# Patient Record
Sex: MALE | Race: BLACK OR AFRICAN AMERICAN | Employment: OTHER | URBAN - METROPOLITAN AREA
[De-identification: names, ages, dates, MRNs, and addresses within clinical notes are randomized per-mention and may not be internally consistent; named-entity substitution may affect disease eponyms.]

---

## 2017-02-09 ENCOUNTER — OFFICE VISIT (OUTPATIENT)
Dept: INTERNAL MEDICINE CLINIC | Age: 77
End: 2017-02-09

## 2017-02-09 VITALS
HEIGHT: 71 IN | OXYGEN SATURATION: 98 % | DIASTOLIC BLOOD PRESSURE: 79 MMHG | WEIGHT: 153.9 LBS | TEMPERATURE: 98.2 F | BODY MASS INDEX: 21.55 KG/M2 | HEART RATE: 71 BPM | SYSTOLIC BLOOD PRESSURE: 135 MMHG

## 2017-02-09 DIAGNOSIS — N40.0 PROSTATISM: ICD-10-CM

## 2017-02-09 DIAGNOSIS — R79.89 AZOTEMIA: ICD-10-CM

## 2017-02-09 DIAGNOSIS — R80.9 PROTEINURIA: ICD-10-CM

## 2017-02-09 DIAGNOSIS — E78.5 DYSLIPIDEMIA: ICD-10-CM

## 2017-02-09 DIAGNOSIS — L30.9 DERMATITIS: ICD-10-CM

## 2017-02-09 DIAGNOSIS — Z00.00 ROUTINE GENERAL MEDICAL EXAMINATION AT A HEALTH CARE FACILITY: ICD-10-CM

## 2017-02-09 DIAGNOSIS — I10 ESSENTIAL HYPERTENSION: Primary | ICD-10-CM

## 2017-02-09 RX ORDER — SIMVASTATIN 40 MG/1
40 TABLET, FILM COATED ORAL
Qty: 30 TAB | Refills: 11 | Status: SHIPPED | OUTPATIENT
Start: 2017-02-09 | End: 2018-02-08 | Stop reason: SDUPTHER

## 2017-02-09 RX ORDER — LOSARTAN POTASSIUM 100 MG/1
100 TABLET ORAL DAILY
Qty: 30 TAB | Refills: 11 | Status: SHIPPED | OUTPATIENT
Start: 2017-02-09 | End: 2018-02-08 | Stop reason: SDUPTHER

## 2017-02-09 RX ORDER — FINASTERIDE 5 MG/1
5 TABLET, FILM COATED ORAL DAILY
Qty: 30 TAB | Refills: 11 | Status: SHIPPED | OUTPATIENT
Start: 2017-02-09 | End: 2017-02-10

## 2017-02-09 RX ORDER — AMLODIPINE BESYLATE 10 MG/1
10 TABLET ORAL DAILY
Qty: 30 TAB | Refills: 11 | Status: SHIPPED | OUTPATIENT
Start: 2017-02-09 | End: 2017-02-10

## 2017-02-09 RX ORDER — SILDENAFIL 100 MG/1
TABLET, FILM COATED ORAL
Qty: 7 TAB | Refills: 11 | Status: SHIPPED | OUTPATIENT
Start: 2017-02-09 | End: 2018-02-08 | Stop reason: CLARIF

## 2017-02-09 NOTE — PROGRESS NOTES
Chief Complaint   Patient presents with    Hypertension     follow up     Well Male   1. Have you been to the ER, urgent care clinic since your last visit? Hospitalized since your last visit? Yes Reason for visit: sore throat and back pain    2. Have you seen or consulted any other health care providers outside of the [address] [place] [address] since your last visit? Include any pap smears or colon screening.  Yes Where: [facility]

## 2017-02-09 NOTE — PROGRESS NOTES
[facility]  [place] [address]  [address] [facility] [address]  Phone:  [phone]  Fax: [phone]       Chief Complaint   Patient presents with    Hypertension     follow up     Well Male   . SUBJECTIVE:    Mita Diaz is a 68 y.o. male comes in for return visit stating that he is generally doing well. He remains physically and mentally active. He is a retired . He has two sons and three grandchildren. He has been taking his antihypertensive medication as prescribed as is the case with his statin for his increased cardiovascular risk. On his last lab work he had a slight increase in his creatinine with a mild degree of proteinuria. I am not entirely sure of the significance of this. Finally, he does see a urologist annually. Facility-Administered Medications Ordered in Other Visits   Medication Dose Route Frequency Provider Last Rate Last Dose    [START ON 2/12/2017] valsartan (DIOVAN) tablet 160 mg  160 mg Oral DAILY Alethea Shah MD        sodium chloride (NS) flush 5-10 mL  5-10 mL IntraVENous Q8H Jayy Class V, MD   10 mL at 02/11/17 1434    sodium chloride (NS) flush 5-10 mL  5-10 mL IntraVENous PRN Alethea Shah MD        acetaminophen (TYLENOL) tablet 650 mg  650 mg Oral Q4H PRN Alethea Shah MD        ondansetron [facility]) injection 4 mg  4 mg IntraVENous Q4H PRN Alethea Shah MD        bisacodyl (DULCOLAX) tablet 5 mg  5 mg Oral DAILY PRN Alethea Shah MD        atorvastatin (LIPITOR) tablet 20 mg  20 mg Oral QHS Jayy ORTIZ MD   20 mg at 02/10/17 2216    amLODIPine (NORVASC) tablet 5 mg  5 mg Oral DAILY Alethea Shah MD   5 mg at 02/11/17 6611    finasteride (PROSCAR) tablet 5 mg  5 mg Oral DAILY Alethea Shah MD   5 mg at 02/11/17 5200     Past Medical History   Diagnosis Date    Hypertension      History reviewed. No pertinent past surgical history.   Allergies   Allergen Reactions    Lisinopril Swelling         REVIEW OF SYSTEMS:  General: negative for - chills or fever  ENT: negative for - headaches, nasal congestion or tinnitus  Respiratory: negative for - cough, hemoptysis, shortness of breath or wheezing  Cardiovascular : negative for - chest pain, edema, palpitations or shortness of breath  Gastrointestinal: negative for - abdominal pain, blood in stools, heartburn or nausea/vomiting  Genito-Urinary: no dysuria, trouble voiding, or hematuria  Musculoskeletal: negative for - gait disturbance, joint pain, joint stiffness or joint swelling  Neurological: no TIA or stroke symptoms  Hematologic: no bruises, no bleeding, no swollen glands  Integument: no lumps, mole changes, nail changes or rash  Endocrine: no malaise/lethargy or unexpected weight changes      Social History     Social History    Marital status:      Spouse name: N/A    Number of children: N/A    Years of education: N/A     Social History Main Topics    Smoking status: Never Smoker    Smokeless tobacco: Never Used    Alcohol use Yes    Drug use: No    Sexual activity: Yes     Partners: Female     Other Topics Concern    None     Social History Narrative     History reviewed. No pertinent family history. OBJECTIVE:    Visit Vitals    /79 (BP 1 Location: Left arm, BP Patient Position: Sitting)    Pulse 71    Temp 98.2 °F (36.8 °C) (Oral)    Ht 5' 11\" (1.803 m)    Wt 153 lb 14.4 oz (69.8 kg)    SpO2 98%    BMI 21.46 kg/m2     CONSTITUTIONAL: well , well nourished, appears age appropriate  EYES: perrla, eom intact  ENMT:moist mucous membranes, pharynx clear  NECK: supple. Thyroid normal  RESPIRATORY: Chest: clear to ascultation and percussion   CARDIOVASCULAR: Heart: regular rate and rhythm  GASTROINTESTINAL: Abdomen: soft, bowel sounds active  HEMATOLOGIC: no pathological lymph nodes palpated  MUSCULOSKELETAL: Extremities: no edema, pulse 1+   INTEGUMENT: No unusual rashes or suspicious skin lesions noted.  Nails appear normal.  NEUROLOGIC: non-focal exam   MENTAL STATUS: alert and oriented, appropriate affect      ASSESSMENT:  1. Essential hypertension    2. Dyslipidemia    3. Dermatitis    4. Proteinuria    5. Azotemia    6. Prostatism    7. Routine general medical examination at a health care facility        PLAN:    1. The patient's blood pressure is excellent today at 120/70. No adjustments are made. 2. Lipid status will be assessed in view of his history of dyslipidemia. He is in a primary cardiovascular risk prevention mode. 3. He has a nonspecific dermatitis suggestive of tinea versicolor. Nothing need be done. 4. He did have a mild azotemia on his last visit with increase proteinuria. This will be measured and quantitated again. I will assume there should be no progression. Addendum:  The patient declines a colonoscopy. .  Orders Placed This Encounter    APOLIPOPROTEIN B    CBC WITH AUTOMATED DIFF    LIPID PANEL    URINALYSIS W/ RFLX MICROSCOPIC    METABOLIC PANEL, COMPREHENSIVE    PROTEIN,TOTAL,URINE    CREATININE, UR, RANDOM    CYSTATIN C    MICROSCOPIC EXAMINATION    AMB POC EKG ROUTINE W/ 12 LEADS, INTER & REP    DISCONTD: amLODIPine (NORVASC) 10 mg tablet    simvastatin (ZOCOR) 40 mg tablet    losartan (COZAAR) 100 mg tablet    sildenafil citrate (VIAGRA) 100 mg tablet    DISCONTD: finasteride (PROSCAR) 5 mg tablet         Follow-up Disposition:  Return in about 1 year (around 2/9/2018). Floyd Rogel MD    This is a Subsequent Medicare Annual Wellness Visit providing Personalized Prevention Plan Services (PPPS) (Performed 12 months after initial AWV and PPPS )    I have reviewed the patient's medical history in detail and updated the computerized patient record. History     Past Medical History   Diagnosis Date    Hypertension       History reviewed. No pertinent past surgical history.   Facility-Administered Medications Ordered in Other Visits   Medication Dose Route Frequency Provider Last Rate Last Dose    [START ON 2/12/2017] valsartan (DIOVAN) tablet 160 mg  160 mg Oral DAILY Jg Puga MD        sodium chloride (NS) flush 5-10 mL  5-10 mL IntraVENous Q8H Mindi ORTIZ MD   10 mL at 02/11/17 1434    sodium chloride (NS) flush 5-10 mL  5-10 mL IntraVENous PRN Jg Puga MD        acetaminophen (TYLENOL) tablet 650 mg  650 mg Oral Q4H PRN Jg Puga MD        ondansetron [facility]) injection 4 mg  4 mg IntraVENous Q4H PRN Jg Puga MD        bisacodyl (DULCOLAX) tablet 5 mg  5 mg Oral DAILY PRN Jg Puga MD        atorvastatin (LIPITOR) tablet 20 mg  20 mg Oral QHS Mindi ORTIZ MD   20 mg at 02/10/17 2216    amLODIPine (NORVASC) tablet 5 mg  5 mg Oral DAILY Jg Puga MD   5 mg at 02/11/17 1447    finasteride (PROSCAR) tablet 5 mg  5 mg Oral DAILY Jg Puga MD   5 mg at 02/11/17 8892     Allergies   Allergen Reactions    Lisinopril Swelling     History reviewed. No pertinent family history. Social History   Substance Use Topics    Smoking status: Never Smoker    Smokeless tobacco: Never Used    Alcohol use Yes     Patient Active Problem List   Diagnosis Code    Hypertension I10    Prostatism N40.0    Dyslipidemia E78.5    Dermatitis L30.9    Proteinuria R80.9    Thrombocytopenia ([address] D69.6       Depression Risk Factor Screening:     PHQ 2 / 9, over the last two weeks 2/9/2017   Little interest or pleasure in doing things Not at all   Feeling down, depressed or hopeless Not at all   Total Score PHQ 2 0     Alcohol Risk Factor Screening: On any occasion during the past 3 months, have you had more than 4 drinks containing alcohol? Yes    Do you average more than 14 drinks per week? No      Functional Ability and Level of Safety:     Hearing Loss   none    Activities of Daily Living   Self-care. Requires assistance with: no ADLs    Fall Risk     Fall Risk Assessment, last 12 mths 2/9/2017   Able to walk? Yes   Fall in past 12 months?  No     Abuse Screen Patient is not abused    Review of Systems   A comprehensive review of systems was negative. Physical Examination     Evaluation of Cognitive Function:  Mood/affect:  neutral  Appearance: age appropriate  Family member/caregiver input: na    Visit Vitals    /79 (BP 1 Location: Left arm, BP Patient Position: Sitting)    Pulse 71    Temp 98.2 °F (36.8 °C) (Oral)    Ht 5' 11\" (1.803 m)    Wt 153 lb 14.4 oz (69.8 kg)    SpO2 98%    BMI 21.46 kg/m2     General:  Alert, cooperative, no distress, appears stated age. Head:  Normocephalic, without obvious abnormality, atraumatic. Eyes:  Conjunctivae/corneas clear. PERRL, EOMs intact. Fundi benign   Ears:  Normal TMs and external ear canals both ears. Nose: Nares normal. Septum midline. Mucosa normal. No drainage or sinus tenderness. Throat: Lips, mucosa, and tongue normal. Teeth and gums normal.   Neck: Supple, symmetrical, trachea midline, no adenopathy, thyroid: no enlargement/tenderness/nodules, no carotid bruit and no JVD. Back:   Symmetric, no curvature. ROM normal. No CVA tenderness. Lungs:   Clear to auscultation bilaterally. Chest wall:  No tenderness or deformity. Heart:  Regular rate and rhythm, S1, S2 normal, no murmur, click, rub or gallop. Abdomen:   Soft, non-tender. Bowel sounds normal. No masses,  No organomegaly. Genitalia:  deferred. Rectal:  Normal tone, normal prostate, no masses or tenderness  Guaiac negative stool. Extremities: Extremities normal, atraumatic, no cyanosis or edema. Pulses: 2+ and symmetric all extremities. Skin: Skin color, texture, turgor normal. No rashes or lesions   Lymph nodes: Cervical, supraclavicular, and axillary nodes normal.   Neurologic: CNII-XII intact. Normal strength, sensation and reflexes throughout.        Patient Care Team:  Gonzalo Argueta MD as PCP - General (Internal Medicine)    Advice/Referrals/Counseling   Education and counseling provided:  Are appropriate based on today's review and evaluation    Assessment/Plan       ICD-10-CM ICD-9-CM    1. Essential hypertension I10 401.9 CBC WITH AUTOMATED DIFF      URINALYSIS W/ RFLX MICROSCOPIC      CT COLLECTION VENOUS BLOOD,VENIPUNCTURE      METABOLIC PANEL, COMPREHENSIVE      CYSTATIN C      AMB POC EKG ROUTINE W/ 12 LEADS, INTER & REP      DISCONTINUED: amLODIPine (NORVASC) 10 mg tablet   2. Dyslipidemia E78.5 272.4 APOLIPOPROTEIN B      LIPID PANEL   3. Dermatitis L30.9 692.9    4. Proteinuria R80.9 791.0 PROTEIN,TOTAL,URINE      CREATININE, UR, RANDOM   5. Azotemia R79.89 790.6 CYSTATIN C   6. Prostatism N40.0 600.90 DISCONTINUED: finasteride (PROSCAR) 5 mg tablet   7. Routine general medical examination at a health care facility Z00.00 V70.0    . [facility]  [place] [address]  [address] [place] [address]  Phone:  [phone]  Fax: [phone]       Chief Complaint   Patient presents with    Hypertension     follow up     Well Male   . SUBJECTIVE:    Vasu Herrera is a 68 y.o. male comes in for his yearly physical.  He has no major complaints. He has been taking his antihypertensive medication as prescribed as is the case with his statin for his increased cardiovascular risk. He has a history of prostatism and sees a urologist on an annual basis. He is not physically active. He formerly did substitute teaching but none of late. He is assisting with the care of his grandchildren.              Facility-Administered Medications Ordered in Other Visits   Medication Dose Route Frequency Provider Last Rate Last Dose    [START ON 2/12/2017] valsartan (DIOVAN) tablet 160 mg  160 mg Oral DAILY Farooq Pritchett MD        sodium chloride (NS) flush 5-10 mL  5-10 mL IntraVENous Q8H Wilber ORTIZ MD   10 mL at 02/11/17 1434    sodium chloride (NS) flush 5-10 mL  5-10 mL IntraVENous PRN Faoroq Pritchett MD        acetaminophen (TYLENOL) tablet 650 mg  650 mg Oral Q4H PRN Farooq Pritchett MD  ondansetron (ZOFRAN) injection 4 mg  4 mg IntraVENous Q4H PRN Yamila Villanueva MD        bisacodyl (DULCOLAX) tablet 5 mg  5 mg Oral DAILY PRN Yamila Villanueva MD        atorvastatin (LIPITOR) tablet 20 mg  20 mg Oral QHS Gene ORTIZ MD   20 mg at 02/10/17 2216    amLODIPine (NORVASC) tablet 5 mg  5 mg Oral DAILY Yamila Villanueva MD   5 mg at 02/11/17 2421    finasteride (PROSCAR) tablet 5 mg  5 mg Oral DAILY Yamila Villanueva MD   5 mg at 02/11/17 7029     Past Medical History   Diagnosis Date    Hypertension      History reviewed. No pertinent past surgical history. Allergies   Allergen Reactions    Lisinopril Swelling         REVIEW OF SYSTEMS:  General: negative for - chills or fever  ENT: negative for - headaches, nasal congestion or tinnitus  Respiratory: negative for - cough, hemoptysis, shortness of breath or wheezing  Cardiovascular : negative for - chest pain, edema, palpitations or shortness of breath  Gastrointestinal: negative for - abdominal pain, blood in stools, heartburn or nausea/vomiting  Genito-Urinary: no dysuria, trouble voiding, or hematuria  Musculoskeletal: negative for - gait disturbance, joint pain, joint stiffness or joint swelling  Neurological: no TIA or stroke symptoms  Hematologic: no bruises, no bleeding, no swollen glands  Integument: no lumps, mole changes, nail changes or rash  Endocrine: no malaise/lethargy or unexpected weight changes      Social History     Social History    Marital status:      Spouse name: N/A    Number of children: N/A    Years of education: N/A     Social History Main Topics    Smoking status: Never Smoker    Smokeless tobacco: Never Used    Alcohol use Yes    Drug use: No    Sexual activity: Yes     Partners: Female     Other Topics Concern    None     Social History Narrative     History reviewed. No pertinent family history.     OBJECTIVE:    Visit Vitals    /79 (BP 1 Location: Left arm, BP Patient Position: Sitting)    Pulse 71    Temp 98.2 °F (36.8 °C) (Oral)    Ht 5' 11\" (1.803 m)    Wt 153 lb 14.4 oz (69.8 kg)    SpO2 98%    BMI 21.46 kg/m2     CONSTITUTIONAL: well , well nourished, appears age appropriate  EYES: perrla, eom intact  ENMT:moist mucous membranes, pharynx clear  NECK: supple. Thyroid normal  RESPIRATORY: Chest: clear to ascultation and percussion   CARDIOVASCULAR: Heart: regular rate and rhythm  GASTROINTESTINAL: Abdomen: soft, bowel sounds active  HEMATOLOGIC: no pathological lymph nodes palpated  MUSCULOSKELETAL: Extremities: no edema, pulse 1+   INTEGUMENT: No unusual rashes or suspicious skin lesions noted. Nails appear normal.  NEUROLOGIC: non-focal exam   MENTAL STATUS: alert and oriented, appropriate affect      ASSESSMENT:  1. Essential hypertension    2. Dyslipidemia    3. Dermatitis    4. Proteinuria    5. Azotemia    6. Prostatism    7. Routine general medical examination at a health care facility        PLAN:    1. Blood pressure is excellent today and no adjustments are made. 2. He will continue his statin as prescribed and efficacy and compliance will be assessed. 3. He has a nonspecific dermatitis which he has had for years and does not really complain about this. 4. His last several visits he has had mild proteinuria. This will be quantitated. 5. He also developed a mild prerenal azotemia. He never returned for additional lab work. Hopefully this has not progressed but I see no reason for that to have done so.    6. He will follow-up with his urologist.        Orders Placed This Encounter    APOLIPOPROTEIN B    CBC WITH AUTOMATED DIFF    LIPID PANEL    URINALYSIS W/ RFLX MICROSCOPIC    METABOLIC PANEL, COMPREHENSIVE    PROTEIN,TOTAL,URINE    CREATININE, UR, RANDOM    CYSTATIN C    MICROSCOPIC EXAMINATION    AMB POC EKG ROUTINE W/ 12 LEADS, INTER & REP    DISCONTD: amLODIPine (NORVASC) 10 mg tablet    simvastatin (ZOCOR) 40 mg tablet    losartan (COZAAR) 100 mg tablet    sildenafil citrate (VIAGRA) 100 mg tablet    DISCONTD: finasteride (PROSCAR) 5 mg tablet         Follow-up Disposition:  Return in about 1 year (around 2/9/2018).       Joanne Hester MD

## 2017-02-09 NOTE — MR AVS SNAPSHOT
Visit Information Date & Time Provider Department Dept. Phone Encounter #  
 2/9/2017 11:30 AM Hyacinth Hammans, MD [facility] [phone] [id number] Follow-up Instructions Return in about 1 year (around 2/9/2018). Upcoming Health Maintenance Date Due  
 MEDICARE YEARLY EXAM 10/1/2016 GLAUCOMA SCREENING Q2Y 10/1/2017 Pneumococcal 65+ Low/Medium Risk (2 of 2 - PPSV23) 2/9/2018 DTaP/Tdap/Td series (2 - Td) 2/9/2027 Allergies as of 2/9/2017  Review Complete On: 2/9/2017 By: Luis Thapa Severity Noted Reaction Type Reactions Lisinopril  10/14/2014    Swelling Current Immunizations  Never Reviewed No immunizations on file. Not reviewed this visit You Were Diagnosed With   
  
 Codes Comments Essential hypertension    -  Primary ICD-10-CM: I10 
ICD-9-CM: 401.9 Dyslipidemia     ICD-10-CM: E78.5 ICD-9-CM: 272.4 Dermatitis     ICD-10-CM: L30.9 ICD-9-CM: 692.9 Proteinuria     ICD-10-CM: R80.9 ICD-9-CM: 791.0 Azotemia     ICD-10-CM: R79.89 ICD-9-CM: 790.6 Prostatism     ICD-10-CM: N40.0 ICD-9-CM: 600.90 Vitals BP Pulse Temp Height(growth percentile) Weight(growth percentile) SpO2  
 135/79 (BP 1 Location: Left arm, BP Patient Position: Sitting) 71 98.2 °F (36.8 °C) (Oral) 5' 11\" (1.803 m) 153 lb 14.4 oz (69.8 kg) 98% BMI Smoking Status 21.46 kg/m2 Never Smoker BMI and BSA Data Body Mass Index Body Surface Area  
 21.46 kg/m 2 1.87 m 2 Preferred Pharmacy Pharmacy Name Phone [id number] Kartik Hammer [address] [phone] Your Updated Medication List  
  
   
This list is accurate as of: 2/9/17 12:51 PM.  Always use your most recent med list. amLODIPine 10 mg tablet Commonly known as:  Royer Llamas Take 1 Tab by mouth daily. finasteride 5 mg tablet Commonly known as:  PROSCAR Take 1 Tab by mouth daily. losartan 100 mg tablet Commonly known as:  COZAAR Take 1 Tab by mouth daily. sildenafil citrate 100 mg tablet Commonly known as:  VIAGRA TAKE 1 TABLET BY MOUTH AS NEEDED  
  
 simvastatin 40 mg tablet Commonly known as:  ZOCOR Take 1 Tab by mouth nightly. Prescriptions Sent to Pharmacy Refills  
 amLODIPine (NORVASC) 10 mg tablet 11 Sig: Take 1 Tab by mouth daily. Class: Normal  
 Pharmacy: [facility] [address] Ph #: [phone] Route: Oral  
 simvastatin (ZOCOR) 40 mg tablet 11 Sig: Take 1 Tab by mouth nightly. Class: Normal  
 [facility] [address] Ph #: [phone] Route: Oral  
 losartan (COZAAR) 100 mg tablet 11 Sig: Take 1 Tab by mouth daily. Class: Normal  
 Pharmacy: [address] Ph #: [phone] Route: Oral  
 sildenafil citrate (VIAGRA) 100 mg tablet 11 Sig: TAKE 1 TABLET BY MOUTH AS NEEDED Class: Normal  
 [facility] [address] Ph #: [phone]  
 finasteride (PROSCAR) 5 mg tablet 11 Sig: Take 1 Tab by mouth daily. Class: Normal  
 Pharmacy: [address] Ph #: [phone] Route: Oral  
  
We Performed the Following AMB POC EKG ROUTINE W/ 12 LEADS, INTER & REP [44611 CPT(R)] APOLIPOPROTEIN B [id number] CPT(R)] CBC WITH AUTOMATED DIFF [70456 CPT(R)] CREATININE, UR, RANDOM [id number] CPT(R)] CYSTATIN C [[id number] Custom] LIPID PANEL [30025 CPT(R)] METABOLIC PANEL, COMPREHENSIVE [85544 CPT(R)] LA COLLECTION VENOUS BLOOD,VENIPUNCTURE [id number] CPT(R)] Michelle Castellanos [[id number] Custom] URINALYSIS W/ RFLX MICROSCOPIC [43109 CPT(R)] Follow-up Instructions Return in about 1 year (around 2/9/2018). Introducing [facility]! [facility] introduces [facility] patient portal. Now you can access parts of your medical record, email your doctor's office, and request medication refills online. 1. In your internet browser, go to [email or web address] 2. Click on the First Time User? Click Here link in the Sign In box. You will see the New Member Sign Up page. 3. Enter your [employer] Access Code exactly as it appears below. You will not need to use this code after youve completed the sign-up process. If you do not sign up before the expiration date, you must request a new code. · [employer] Access Code: [email or web address] Expires: 5/10/2017 12:51 PM 
 
4. Enter the last four digits of your Social Security Number (xxxx) and Date of Birth (mm/dd/yyyy) as indicated and click Submit. You will be taken to the next sign-up page. 5. Create a [employer] ID. This will be your [employer] login ID and cannot be changed, so think of one that is secure and easy to remember. 6. Create a [employer] password. You can change your password at any time. 7. Enter your Password Reset Question and Answer. This can be used at a later time if you forget your password. 8. Enter your e-mail address. You will receive e-mail notification when new information is available in [address] 9. Click Sign Up. You can now view and download portions of your medical record. 10. Click the Download Summary menu link to download a portable copy of your medical information. If you have questions, please visit the Frequently Asked Questions section of the [employer] website. Remember, [employer] is NOT to be used for urgent needs. For medical emergencies, dial 911. Now available from your iPhone and Android! Please provide this summary of care documentation to your next provider. Your primary care clinician is listed as Marly Varela. If you have any questions after today's visit, please call [phone].

## 2017-02-10 ENCOUNTER — HOSPITAL ENCOUNTER (INPATIENT)
Age: 77
LOS: 1 days | Discharge: HOME | DRG: 813 | End: 2017-02-13
Attending: EMERGENCY MEDICINE | Admitting: INTERNAL MEDICINE
Payer: MEDICARE

## 2017-02-10 ENCOUNTER — CLINICAL SUPPORT (OUTPATIENT)
Dept: INTERNAL MEDICINE CLINIC | Age: 77
End: 2017-02-10

## 2017-02-10 DIAGNOSIS — D69.6 THROMBOCYTOPENIA (HCC): Primary | ICD-10-CM

## 2017-02-10 DIAGNOSIS — D69.6 THROMBOCYTOPENIA, UNSPECIFIED (HCC): Primary | ICD-10-CM

## 2017-02-10 LAB
ALBUMIN SERPL BCP-MCNC: 3.6 G/DL (ref 3.5–5)
ALBUMIN/GLOB SERPL: 1 {RATIO} (ref 1.1–2.2)
ALP SERPL-CCNC: 50 U/L (ref 45–117)
ALT SERPL-CCNC: 27 U/L (ref 12–78)
ANION GAP BLD CALC-SCNC: 7 MMOL/L (ref 5–15)
AST SERPL W P-5'-P-CCNC: 22 U/L (ref 15–37)
BASOPHILS # BLD AUTO: 0.1 K/UL
BASOPHILS # BLD: 2 %
BILIRUB SERPL-MCNC: 0.4 MG/DL (ref 0.2–1)
BUN SERPL-MCNC: 22 MG/DL (ref 6–20)
BUN/CREAT SERPL: 15 (ref 12–20)
CALCIUM SERPL-MCNC: 8.8 MG/DL (ref 8.5–10.1)
CHLORIDE SERPL-SCNC: 107 MMOL/L (ref 97–108)
CO2 SERPL-SCNC: 28 MMOL/L (ref 21–32)
CREAT SERPL-MCNC: 1.49 MG/DL (ref 0.7–1.3)
EOSINOPHIL # BLD: 0.2 K/UL
EOSINOPHIL NFR BLD: 5 %
ERYTHROCYTE [DISTWIDTH] IN BLOOD BY AUTOMATED COUNT: 13.5 % (ref 11.5–14.5)
GLOBULIN SER CALC-MCNC: 3.6 G/DL (ref 2–4)
GLUCOSE SERPL-MCNC: 92 MG/DL (ref 65–100)
HCT VFR BLD AUTO: 39.3 % (ref 36.6–50.3)
HGB BLD-MCNC: 12.5 G/DL (ref 12.1–17)
LYMPHOCYTES # BLD AUTO: 34 %
LYMPHOCYTES # BLD: 1.1 K/UL
MCH RBC QN AUTO: 28.2 PG (ref 26–34)
MCHC RBC AUTO-ENTMCNC: 31.8 G/DL (ref 30–36.5)
MCV RBC AUTO: 88.7 FL (ref 80–99)
MONOCYTES # BLD: 0.5 K/UL
MONOCYTES NFR BLD AUTO: 16 %
NEUTS SEG # BLD: 1.4 K/UL
NEUTS SEG NFR BLD AUTO: 43 %
PLATELET # BLD AUTO: 16 K/UL (ref 150–400)
PLATELET COMMENTS,PCOM: ABNORMAL
POTASSIUM SERPL-SCNC: 4.1 MMOL/L (ref 3.5–5.1)
PROT SERPL-MCNC: 7.2 G/DL (ref 6.4–8.2)
RBC # BLD AUTO: 4.43 M/UL (ref 4.1–5.7)
RBC MORPH BLD: ABNORMAL
SODIUM SERPL-SCNC: 142 MMOL/L (ref 136–145)
WBC # BLD AUTO: 3.3 K/UL (ref 4.1–11.1)

## 2017-02-10 PROCEDURE — 86900 BLOOD TYPING SEROLOGIC ABO: CPT | Performed by: INTERNAL MEDICINE

## 2017-02-10 PROCEDURE — 85025 COMPLETE CBC W/AUTO DIFF WBC: CPT | Performed by: EMERGENCY MEDICINE

## 2017-02-10 PROCEDURE — 36415 COLL VENOUS BLD VENIPUNCTURE: CPT | Performed by: EMERGENCY MEDICINE

## 2017-02-10 PROCEDURE — 99285 EMERGENCY DEPT VISIT HI MDM: CPT

## 2017-02-10 PROCEDURE — 74011250637 HC RX REV CODE- 250/637: Performed by: INTERNAL MEDICINE

## 2017-02-10 PROCEDURE — 80053 COMPREHEN METABOLIC PANEL: CPT | Performed by: EMERGENCY MEDICINE

## 2017-02-10 PROCEDURE — 65660000000 HC RM CCU STEPDOWN

## 2017-02-10 RX ORDER — ATORVASTATIN CALCIUM 10 MG/1
20 TABLET, FILM COATED ORAL
Status: DISCONTINUED | OUTPATIENT
Start: 2017-02-10 | End: 2017-02-13 | Stop reason: HOSPADM

## 2017-02-10 RX ORDER — SODIUM CHLORIDE 0.9 % (FLUSH) 0.9 %
5-10 SYRINGE (ML) INJECTION EVERY 8 HOURS
Status: DISCONTINUED | OUTPATIENT
Start: 2017-02-10 | End: 2017-02-13 | Stop reason: HOSPADM

## 2017-02-10 RX ORDER — SODIUM CHLORIDE 0.9 % (FLUSH) 0.9 %
5-10 SYRINGE (ML) INJECTION AS NEEDED
Status: DISCONTINUED | OUTPATIENT
Start: 2017-02-10 | End: 2017-02-13 | Stop reason: HOSPADM

## 2017-02-10 RX ORDER — ONDANSETRON 2 MG/ML
4 INJECTION INTRAMUSCULAR; INTRAVENOUS
Status: DISCONTINUED | OUTPATIENT
Start: 2017-02-10 | End: 2017-02-13 | Stop reason: HOSPADM

## 2017-02-10 RX ORDER — ACETAMINOPHEN 325 MG/1
650 TABLET ORAL
Status: DISCONTINUED | OUTPATIENT
Start: 2017-02-10 | End: 2017-02-13 | Stop reason: HOSPADM

## 2017-02-10 RX ORDER — VALSARTAN 40 MG/1
160 TABLET ORAL DAILY
Status: DISCONTINUED | OUTPATIENT
Start: 2017-02-11 | End: 2017-02-11

## 2017-02-10 RX ORDER — FINASTERIDE 5 MG/1
5 TABLET, FILM COATED ORAL DAILY
Status: DISCONTINUED | OUTPATIENT
Start: 2017-02-11 | End: 2017-02-13 | Stop reason: HOSPADM

## 2017-02-10 RX ORDER — BISACODYL 5 MG
5 TABLET, DELAYED RELEASE (ENTERIC COATED) ORAL DAILY PRN
Status: DISCONTINUED | OUTPATIENT
Start: 2017-02-10 | End: 2017-02-13 | Stop reason: HOSPADM

## 2017-02-10 RX ORDER — AMLODIPINE BESYLATE 5 MG/1
5 TABLET ORAL DAILY
Status: DISCONTINUED | OUTPATIENT
Start: 2017-02-11 | End: 2017-02-13 | Stop reason: HOSPADM

## 2017-02-10 RX ADMIN — Medication 10 ML: at 22:18

## 2017-02-10 RX ADMIN — ATORVASTATIN CALCIUM 20 MG: 10 TABLET, FILM COATED ORAL at 22:16

## 2017-02-10 NOTE — IP AVS SNAPSHOT
[address] [facility] 
[phone] Patient: Edwina Rao. MRN: [id number] KIX:[id number] You are allergic to the following Allergen Reactions Lisinopril Swelling Immunizations Administered for This Admission Name Date Influenza Vaccine (Quad) PF  Deferred () Recent Documentation Height Weight BMI Smoking Status 1.816 m 70.6 kg 21.41 kg/m2 Never Smoker Unresulted Labs Order Current Status HIV 1/2 AG/AB, 4TH GENERATION,W RFLX CONFIRM In process SAMPLE TO BLOOD BANK In process Emergency Contacts Name Discharge Info Relation Home Work Mobile Olga Brunner  Spouse [3] [phone] About your hospitalization You were admitted on:  February 10, 2017 You last received care in the:  [facility] 3 NEUROSCIENCE TELEMETRY You were discharged on:  February 13, 2017 Unit phone number:  [phone] Why you were hospitalized Your primary diagnosis was:  Not on File Your diagnoses also included: Thrombocytopenia (Hcc) Providers Seen During Your Hospitalizations Provider Role Specialty Primary office phone Rashad Medrano MD Attending Provider Emergency Medicine [phone] Preet Joyner MD Attending Provider Internal Medicine [phone] Giovanny Townsend MD Attending Provider Internal Medicine [phone] Your Primary Care Physician (PCP) Primary Care Physician Office Phone Office Fax [address] [facility] [phone] Follow-up Information Follow up With Details Comments Contact Info Giovanny Townsend MD In 3 days Please schedule an appointment to be seen in 3-5 days [id number] [place] [address] 
[phone] Current Discharge Medication List  
  
START taking these medications Dose & Instructions Dispensing Information Comments Morning Noon Evening Bedtime  
 predniSONE 20 mg tablet Commonly known as:  Orlean Aas Your next dose is: Today, Tomorrow Other:  _________ Dose:  60 mg Take 3 Tabs by mouth daily. Quantity:  60 Tab Refills:  0 CONTINUE these medications which have NOT CHANGED Dose & Instructions Dispensing Information Comments Morning Noon Evening Bedtime  
 losartan 100 mg tablet Commonly known as:  COZAAR Your next dose is: Today, Tomorrow Other:  _________ Dose:  100 mg Take 1 Tab by mouth daily. Quantity:  30 Tab Refills:  11  
     
   
   
   
  
 sildenafil citrate 100 mg tablet Commonly known as:  VIAGRA Your next dose is: Today, Tomorrow Other:  _________ TAKE 1 TABLET BY MOUTH AS NEEDED Quantity:  7 Tab Refills:  11  
     
   
   
   
  
 simvastatin 40 mg tablet Commonly known as:  ZOCOR Your next dose is: Today, Tomorrow Other:  _________ Dose:  40 mg Take 1 Tab by mouth nightly. Quantity:  30 Tab Refills:  11 Where to Get Your Medications These medications were sent to [address] [place] [address], [address] [place] [address] Phone:  [phone]  
  predniSONE 20 mg tablet Discharge Instructions General Discharge Instructions Patient ID: 
Jose Fuentes 
[id number] 
68 y.o. 
1940 Patient Instructions The following personal items were collected during your admission and were returned to you. Take Home Medications What to do at [facility] Recommended diet: Regular Diet Recommended activity: Activity as tolerated Follow-up with Danielito Cody MD  in 5 days.  
 
 
 
Information obtained by : 
I understand that if any problems occur once I am at home I am to contact my physician. I understand and acknowledge receipt of the instructions indicated above. Physician's or R.N.'s Signature                                                                  Date/Time Patient or Representative Signature                                                          Date/Time Discharge Orders None Introducing \[facility] [place] [facility]\"" & HEALTH SERVICES! Vivian Savage introduces [facility] patient portal. Now you can access parts of your medical record, email your doctor's office, and request medication refills online. 1. In your internet browser, go to [email or web address] 2. Click on the First Time User? Click Here link in the Sign In box. You will see the New Member Sign Up page. 3. Enter your [facility] Access Code exactly as it appears below. You will not need to use this code after youve completed the sign-up process. If you do not sign up before the expiration date, you must request a new code. · [facility] Access Code: [email or web address] Expires: 5/10/2017 12:51 PM 
 
4. Enter the last four digits of your Social Security Number (xxxx) and Date of Birth (mm/dd/yyyy) as indicated and click Submit. You will be taken to the next sign-up page. 5. Create a [facility] ID. This will be your [facility] login ID and cannot be changed, so think of one that is secure and easy to remember. 6. Create a [facility] password. You can change your password at any time. 7. Enter your Password Reset Question and Answer. This can be used at a later time if you forget your password. 8. Enter your e-mail address. You will receive e-mail notification when new information is available in [address] 9. Click Sign Up. You can now view and download portions of your medical record. 10. Click the Download Summary menu link to download a portable copy of your medical information. If you have questions, please visit the Frequently Asked Questions section of the [employer] website. Remember, [employer] is NOT to be used for urgent needs. For medical emergencies, dial 911. Now available from your iPhone and Android! General Information Please provide this summary of care documentation to your next provider. Patient Signature:  ____________________________________________________________ Date:  ____________________________________________________________  
  
Redd Wilde Provider Signature:  ____________________________________________________________ Date:  ____________________________________________________________

## 2017-02-10 NOTE — IP AVS SNAPSHOT
Current Discharge Medication List  
  
Take these medications at their scheduled times Dose & Instructions Dispensing Information Comments Morning Noon Evening Bedtime  
 losartan 100 mg tablet Commonly known as:  COZAAR Your next dose is: Today, Tomorrow Other:  ____________ Dose:  100 mg Take 1 Tab by mouth daily. Quantity:  30 Tab Refills:  11  
     
   
   
   
  
 predniSONE 20 mg tablet Commonly known as:  Grge Lever Your next dose is: Today, Tomorrow Other:  ____________ Dose:  60 mg Take 3 Tabs by mouth daily. Quantity:  60 Tab Refills:  0  
     
   
   
   
  
 simvastatin 40 mg tablet Commonly known as:  ZOCOR Your next dose is: Today, Tomorrow Other:  ____________ Dose:  40 mg Take 1 Tab by mouth nightly. Quantity:  30 Tab Refills:  11 Take these medications as directed Dose & Instructions Dispensing Information Comments Morning Noon Evening Bedtime  
 sildenafil citrate 100 mg tablet Commonly known as:  VIAGRA Your next dose is: Today, Tomorrow Other:  ____________ TAKE 1 TABLET BY MOUTH AS NEEDED Quantity:  7 Tab Refills:  11 Where to Get Your Medications These medications were sent to [facility], [address], [address] [place] [address] Phone:  [phone]  
  predniSONE 20 mg tablet

## 2017-02-11 LAB
ABO + RH BLD: NORMAL
ALBUMIN SERPL-MCNC: 4.5 G/DL (ref 3.5–4.8)
ALBUMIN/GLOB SERPL: 1.3 {RATIO} (ref 1.1–2.5)
ALP SERPL-CCNC: 57 IU/L (ref 39–117)
ALT SERPL-CCNC: 20 IU/L (ref 0–44)
ANION GAP BLD CALC-SCNC: 7 MMOL/L (ref 5–15)
APO B SERPL-MCNC: 71 MG/DL (ref 52–135)
APPEARANCE UR: ABNORMAL
AST SERPL-CCNC: 24 IU/L (ref 0–40)
BACTERIA #/AREA URNS HPF: ABNORMAL /[HPF]
BASOPHILS # BLD AUTO: 0 K/UL (ref 0–0.1)
BASOPHILS # BLD AUTO: 0.1 K/UL
BASOPHILS # BLD AUTO: 0.1 X10E3/UL (ref 0–0.2)
BASOPHILS # BLD: 1 % (ref 0–1)
BASOPHILS # BLD: 2 %
BASOPHILS NFR BLD AUTO: 2 %
BILIRUB SERPL-MCNC: 0.4 MG/DL (ref 0–1.2)
BILIRUB UR QL STRIP: NEGATIVE
BLASTS NFR BLD: 0 %
BLOOD GROUP ANTIBODIES SERPL: NORMAL
BLOOD GROUP ANTIBODIES SERPL: NORMAL
BUN SERPL-MCNC: 17 MG/DL (ref 6–20)
BUN SERPL-MCNC: 17 MG/DL (ref 8–27)
BUN/CREAT SERPL: 12 (ref 10–22)
BUN/CREAT SERPL: 13 (ref 12–20)
CALCIUM SERPL-MCNC: 8.2 MG/DL (ref 8.5–10.1)
CALCIUM SERPL-MCNC: 9.6 MG/DL (ref 8.6–10.2)
CASTS URNS QL MICRO: ABNORMAL /LPF
CHLORIDE SERPL-SCNC: 107 MMOL/L (ref 97–108)
CHLORIDE SERPL-SCNC: 98 MMOL/L (ref 96–106)
CHOLEST SERPL-MCNC: 162 MG/DL (ref 100–199)
CO2 SERPL-SCNC: 28 MMOL/L (ref 18–29)
CO2 SERPL-SCNC: 28 MMOL/L (ref 21–32)
COLOR UR: YELLOW
CREAT SERPL-MCNC: 1.32 MG/DL (ref 0.7–1.3)
CREAT SERPL-MCNC: 1.42 MG/DL (ref 0.76–1.27)
CREAT UR-MCNC: 94.8 MG/DL
CYSTATIN C SERPL-MCNC: 1.06 MG/L (ref 0.53–0.95)
DAT POLY-SP REAG RBC QL: NORMAL
DIFFERENTIAL METHOD BLD: ABNORMAL
DIFFERENTIAL METHOD BLD: ABNORMAL
EOSINOPHIL # BLD AUTO: 0.1 X10E3/UL (ref 0–0.4)
EOSINOPHIL # BLD: 0.1 K/UL
EOSINOPHIL # BLD: 0.2 K/UL (ref 0–0.4)
EOSINOPHIL NFR BLD AUTO: 2 %
EOSINOPHIL NFR BLD: 3 %
EOSINOPHIL NFR BLD: 5 % (ref 0–7)
EPI CELLS #/AREA URNS HPF: ABNORMAL /HPF
ERYTHROCYTE [DISTWIDTH] IN BLOOD BY AUTOMATED COUNT: 13.2 % (ref 11.5–14.5)
ERYTHROCYTE [DISTWIDTH] IN BLOOD BY AUTOMATED COUNT: 13.5 % (ref 11.5–14.5)
ERYTHROCYTE [DISTWIDTH] IN BLOOD BY AUTOMATED COUNT: 13.9 % (ref 12.3–15.4)
ERYTHROCYTE [SEDIMENTATION RATE] IN BLOOD: 7 MM/HR (ref 0–20)
GLOBULIN SER CALC-MCNC: 3.5 G/DL (ref 1.5–4.5)
GLUCOSE BLD STRIP.AUTO-MCNC: 162 MG/DL (ref 65–100)
GLUCOSE SERPL-MCNC: 124 MG/DL (ref 65–100)
GLUCOSE SERPL-MCNC: 89 MG/DL (ref 65–99)
GLUCOSE UR QL: NEGATIVE
HCT VFR BLD AUTO: 36.6 % (ref 36.6–50.3)
HCT VFR BLD AUTO: 38.3 % (ref 36.6–50.3)
HCT VFR BLD AUTO: 40.8 % (ref 37.5–51)
HDLC SERPL-MCNC: 85 MG/DL
HGB BLD-MCNC: 11.8 G/DL (ref 12.1–17)
HGB BLD-MCNC: 12.3 G/DL (ref 12.1–17)
HGB BLD-MCNC: 13.9 G/DL (ref 12.6–17.7)
HGB UR QL STRIP: NEGATIVE
IMM GRANULOCYTES # BLD: 0 X10E3/UL (ref 0–0.1)
IMM GRANULOCYTES NFR BLD: 1 %
KETONES UR QL STRIP: NEGATIVE
LDH SERPL L TO P-CCNC: 148 U/L (ref 85–241)
LDLC SERPL CALC-MCNC: 65 MG/DL (ref 0–99)
LEUKOCYTE ESTERASE UR QL STRIP: NEGATIVE
LYMPHOCYTES # BLD AUTO: 1.3 X10E3/UL (ref 0.7–3.1)
LYMPHOCYTES # BLD AUTO: 34 %
LYMPHOCYTES # BLD AUTO: 46 % (ref 12–49)
LYMPHOCYTES # BLD: 1 K/UL
LYMPHOCYTES # BLD: 1.5 K/UL (ref 0.8–3.5)
LYMPHOCYTES NFR BLD AUTO: 42 %
MANUAL DIFFERENTIAL PERFORMED BLD QL: ABNORMAL
MCH RBC QN AUTO: 28.3 PG (ref 26–34)
MCH RBC QN AUTO: 28.5 PG (ref 26–34)
MCH RBC QN AUTO: 29.2 PG (ref 26.6–33)
MCHC RBC AUTO-ENTMCNC: 32.1 G/DL (ref 30–36.5)
MCHC RBC AUTO-ENTMCNC: 32.2 G/DL (ref 30–36.5)
MCHC RBC AUTO-ENTMCNC: 34.1 G/DL (ref 31.5–35.7)
MCV RBC AUTO: 86 FL (ref 79–97)
MCV RBC AUTO: 87.8 FL (ref 80–99)
MCV RBC AUTO: 88.7 FL (ref 80–99)
METAMYELOCYTES NFR BLD MANUAL: 0 %
MICRO URNS: ABNORMAL
MONOCYTES # BLD AUTO: 0.3 X10E3/UL (ref 0.1–0.9)
MONOCYTES # BLD: 0.3 K/UL
MONOCYTES # BLD: 0.3 K/UL (ref 0–1)
MONOCYTES NFR BLD AUTO: 12 %
MONOCYTES NFR BLD AUTO: 8 %
MONOCYTES NFR BLD AUTO: 9 % (ref 5–13)
MORPHOLOGY BLD-IMP: ABNORMAL
MORPHOLOGY BLD-IMP: ABNORMAL
MUCOUS THREADS URNS QL MICRO: PRESENT
MYELOCYTES NFR BLD MANUAL: 0 %
NEUTROPHILS # BLD AUTO: 1.5 X10E3/UL (ref 1.4–7)
NEUTROPHILS NFR BLD AUTO: 45 %
NEUTS BAND NFR BLD MANUAL: 0 %
NEUTS SEG # BLD: 1.3 K/UL
NEUTS SEG # BLD: 1.3 K/UL (ref 1.8–8)
NEUTS SEG NFR BLD AUTO: 39 % (ref 32–75)
NEUTS SEG NFR BLD AUTO: 49 %
NITRITE UR QL STRIP: NEGATIVE
NRBC # BLD: 0 K/UL (ref 0–0.01)
NRBC BLD-RTO: 0 PER 100 WBC
PATH REV BLD -IMP: NORMAL
PH UR STRIP: 6.5 [PH] (ref 5–7.5)
PLATELET # BLD AUTO: 12 X10E3/UL (ref 150–379)
PLATELET # BLD AUTO: 16 K/UL (ref 150–400)
PLATELET # BLD AUTO: 18 X10E3/UL (ref 150–379)
PLATELET # BLD AUTO: 19 K/UL (ref 150–400)
PLATELET COMMENTS,PCOM: ABNORMAL
POTASSIUM SERPL-SCNC: 3.6 MMOL/L (ref 3.5–5.1)
POTASSIUM SERPL-SCNC: 4.5 MMOL/L (ref 3.5–5.2)
PROMYELOCYTES NFR BLD MANUAL: 0 %
PROT SERPL-MCNC: 8 G/DL (ref 6–8.5)
PROT UR QL STRIP: ABNORMAL
PROT UR-MCNC: 82.9 MG/DL
RBC # BLD AUTO: 4.17 M/UL (ref 4.1–5.7)
RBC # BLD AUTO: 4.32 M/UL (ref 4.1–5.7)
RBC # BLD AUTO: 4.76 X10E6/UL (ref 4.14–5.8)
RBC #/AREA URNS HPF: ABNORMAL /HPF
RBC MORPH BLD: ABNORMAL
RBC MORPH BLD: ABNORMAL
RETICS/RBC NFR AUTO: 0.5 % (ref 0.7–2.1)
SERVICE CMNT-IMP: ABNORMAL
SODIUM SERPL-SCNC: 142 MMOL/L (ref 134–144)
SODIUM SERPL-SCNC: 142 MMOL/L (ref 136–145)
SP GR UR: 1.02 (ref 1–1.03)
SPECIMEN EXP DATE BLD: NORMAL
TRIGL SERPL-MCNC: 61 MG/DL (ref 0–149)
UROBILINOGEN UR STRIP-MCNC: 0.2 MG/DL (ref 0.2–1)
VIT B12 SERPL-MCNC: 637 PG/ML (ref 211–911)
VLDLC SERPL CALC-MCNC: 12 MG/DL (ref 5–40)
WBC # BLD AUTO: 2.8 K/UL (ref 4.1–11.1)
WBC # BLD AUTO: 3.2 X10E3/UL (ref 3.4–10.8)
WBC # BLD AUTO: 3.3 K/UL (ref 4.1–11.1)
WBC #/AREA URNS HPF: ABNORMAL /HPF
WBC OTHER # BLD MANUAL: 0 10*3/UL

## 2017-02-11 PROCEDURE — 82607 VITAMIN B-12: CPT | Performed by: INTERNAL MEDICINE

## 2017-02-11 PROCEDURE — 36415 COLL VENOUS BLD VENIPUNCTURE: CPT | Performed by: INTERNAL MEDICINE

## 2017-02-11 PROCEDURE — 85045 AUTOMATED RETICULOCYTE COUNT: CPT | Performed by: INTERNAL MEDICINE

## 2017-02-11 PROCEDURE — 99218 HC RM OBSERVATION: CPT

## 2017-02-11 PROCEDURE — 86038 ANTINUCLEAR ANTIBODIES: CPT | Performed by: INTERNAL MEDICINE

## 2017-02-11 PROCEDURE — 85652 RBC SED RATE AUTOMATED: CPT | Performed by: INTERNAL MEDICINE

## 2017-02-11 PROCEDURE — 86880 COOMBS TEST DIRECT: CPT | Performed by: INTERNAL MEDICINE

## 2017-02-11 PROCEDURE — 82962 GLUCOSE BLOOD TEST: CPT

## 2017-02-11 PROCEDURE — 83615 LACTATE (LD) (LDH) ENZYME: CPT | Performed by: INTERNAL MEDICINE

## 2017-02-11 PROCEDURE — 85025 COMPLETE CBC W/AUTO DIFF WBC: CPT | Performed by: INTERNAL MEDICINE

## 2017-02-11 PROCEDURE — 74011250637 HC RX REV CODE- 250/637: Performed by: INTERNAL MEDICINE

## 2017-02-11 PROCEDURE — 85027 COMPLETE CBC AUTOMATED: CPT | Performed by: INTERNAL MEDICINE

## 2017-02-11 PROCEDURE — 80048 BASIC METABOLIC PNL TOTAL CA: CPT | Performed by: INTERNAL MEDICINE

## 2017-02-11 PROCEDURE — 65660000000 HC RM CCU STEPDOWN

## 2017-02-11 RX ORDER — VALSARTAN 160 MG/1
160 TABLET ORAL DAILY
Status: DISCONTINUED | OUTPATIENT
Start: 2017-02-12 | End: 2017-02-13 | Stop reason: HOSPADM

## 2017-02-11 RX ADMIN — AMLODIPINE BESYLATE 5 MG: 5 TABLET ORAL at 08:26

## 2017-02-11 RX ADMIN — FINASTERIDE 5 MG: 5 TABLET, FILM COATED ORAL at 08:26

## 2017-02-11 RX ADMIN — Medication 10 ML: at 14:34

## 2017-02-11 RX ADMIN — Medication 10 ML: at 06:44

## 2017-02-11 RX ADMIN — Medication 10 ML: at 20:48

## 2017-02-11 RX ADMIN — ATORVASTATIN CALCIUM 20 MG: 10 TABLET, FILM COATED ORAL at 20:47

## 2017-02-11 RX ADMIN — VALSARTAN 160 MG: 160 TABLET ORAL at 09:32

## 2017-02-11 NOTE — PROGRESS NOTES
..  * No surgery found *  Bedside and Verbal shift change report given to Tricia Leo (oncoming nurse) by Татьяна Velasquez (offgoing nurse). Report included the following information SBAR and Kardex. [facility] Phone:   [phone]      Significant changes during shift:  Hematology consult        Patient Celeste Smith.  68 y.o.  2/10/2017  7:53 PM by Esther Delgado MD. [employer]. was admitted from Home    Problem List    Patient Active Problem List    Diagnosis Date Noted    Thrombocytopenia ([address] 02/10/2017    Proteinuria 02/09/2017    Dermatitis 10/01/2015    Hypertension 10/14/2014    Prostatism 10/14/2014    Dyslipidemia 10/14/2014     Past Medical History   Diagnosis Date    Hypertension          Core Measures:    CVA: No No  CHF:No No  PNA:No No    Post Op Surgical (If Applicable):     Number times ambulated in hallway past shift:  0  Number of times OOB to chair past shift:   0  NG Tube: No  Incentive Spirometer: No  Drains: No   Volume    Dressing Present:  No  Flatus:  Yes    Activity Status:    OOB to Chair Yes  Ambulated this shift No   Bed Rest No    Supplemental O2: (If Applicable)    NC No  NRB No  Venti-mask No  On  Liters/min      LINES AND DRAINS:    Central Line? No Placement date  Reason Medically Necessary     PICC LINE? No Placement date Reason Medically Necessary     Urinary Catheter? No Placement Date  Reason Medically Necessary     DVT prophylaxis:    DVT prophylaxis Med- No   DVT prophylaxis SCD or MARK- No     Wounds: (If Applicable)    Wounds- No    Location     Patient Safety:    Falls Score Total Score: 1  Safety Level_______  Bed Alarm On? Refused  Sitter?  No    Plan for upcoming shift: monitor platelets        Discharge Plan: Yes     Active Consults:  IP CONSULT TO HEMATOLOGY

## 2017-02-11 NOTE — H&P
Hospitalist Admission Note    NAME: Cuong Galloway   :  1940   MRN:  [id number]     Date/Time:  2/10/2017 9:21 PM    Patient PCP: Raz Toth MD  ________________________________________________________________________    My assessment of this patient's clinical condition and my plan of care is as follows. Assessment / Plan:  Acute Thrombocytopenia with neutropenia  -unclear etiology, however differentials include decreased production from hypocellular bone marrow vs increased destruction from immune-mediated pathology, non-immune mediated (DIC, HUS, TTP), vasculitis, abnormal distribution or pooling  -CBC with differential  -peripheral smear  -type and screen  -transfuse for plt <10 or active bleeding  -hematology consulted    Hypertension  -continue arbs, amlodipine    Dyslipidemia  -continue statin    CKD  -cr stable    BPH  -continue finasteride      Code Status: Full  Surrogate Decision Maker: wife Tyree Styles at [phone]    DVT Prophylaxis: pt ambulatory  GI Prophylaxis: not indicated    Baseline: independent        Subjective:   CHIEF COMPLAINT: low platelet count    HISTORY OF PRESENT ILLNESS:     Chloe Espinoza is a 68 y.o.  male with pmhx significant for HTN present to ED from PCP for further evaluation of low platelet count on labs from PCP. Pt denies any recent illness, fever, chills, cp, sob, palpitations, n/v/d, or active bleeding. No recent changes in medications. No hx or family hx of thrombocytopenia. In the ED, vitals stable. Labs include PLT 16, wbc 3.3, cr 1.49. We were asked to admit for work up and evaluation of the above problems. Past Medical History   Diagnosis Date    Hypertension         History reviewed. No pertinent past surgical history. Social History   Substance Use Topics    Smoking status: Never Smoker    Smokeless tobacco: Never Used    Alcohol use Yes        History reviewed. No pertinent family history.   Allergies Allergen Reactions    Lisinopril Swelling        Prior to Admission medications    Medication Sig Start Date End Date Taking? Authorizing Provider   simvastatin (ZOCOR) 40 mg tablet Take 1 Tab by mouth nightly. 2/9/17   Raz Toth MD   losartan (COZAAR) 100 mg tablet Take 1 Tab by mouth daily. 2/9/17   Raz Toth MD   sildenafil citrate (VIAGRA) 100 mg tablet TAKE 1 TABLET BY MOUTH AS NEEDED 2/9/17   Raz Toth MD       REVIEW OF SYSTEMS:     I am not able to complete the review of systems because:    The patient is intubated and sedated    The patient has altered mental status due to his acute medical problems    The patient has baseline aphasia from prior stroke(s)    The patient has baseline dementia and is not reliable historian    The patient is in acute medical distress and unable to provide information           Total of 12 systems reviewed as follows:       POSITIVE= underlined text  Negative = text not underlined  General:  fever, chills, sweats, generalized weakness, weight loss/gain,      loss of appetite   Eyes:    blurred vision, eye pain, loss of vision, double vision  ENT:    rhinorrhea, pharyngitis   Respiratory:   cough, sputum production, SOB, ACEVEDO, wheezing, pleuritic pain   Cardiology:   chest pain, palpitations, orthopnea, PND, edema, syncope   Gastrointestinal:  abdominal pain , N/V, diarrhea, dysphagia, constipation, bleeding   Genitourinary:  frequency, urgency, dysuria, hematuria, incontinence   Muskuloskeletal :  arthralgia, myalgia, back pain  Hematology:  easy bruising, nose or gum bleeding, lymphadenopathy   Dermatological: rash, ulceration, pruritis, color change / jaundice  Endocrine:   hot flashes or polydipsia   Neurological:  headache, dizziness, confusion, focal weakness, paresthesia,     Speech difficulties, memory loss, gait difficulty  Psychological: Feelings of anxiety, depression, agitation    Objective:   VITALS:    Visit Vitals    /74    Pulse 70    Temp 98 °F (36.7 °C)    Resp 16    Ht 5' 11.5\" (1.816 m)    Wt 70.6 kg (155 lb 10.3 oz)    SpO2 100%    BMI 21.41 kg/m2       PHYSICAL EXAM:    General:    Alert, cooperative, no distress, appears stated age. HEENT: Atraumatic, anicteric sclerae, pink conjunctivae     No oral ulcers, mucosa moist, throat clear, dentition fair  Neck:  Supple, symmetrical,  thyroid: non tender  Lungs:   Clear to auscultation bilaterally. No Wheezing or Rhonchi. No rales. Chest wall:  No tenderness  No Accessory muscle use. Heart:   Regular  rhythm,  No  murmur   No edema  Abdomen:   Soft, non-tender. Not distended. Bowel sounds normal  Extremities: No cyanosis. No clubbing      Capillary refill normal,  Radial pulse 2+  Skin:     Not pale. Not Jaundiced  No rashes   Psych:  Good insight. Not depressed. Not anxious or agitated. Neurologic: EOMs intact. No facial asymmetry. No aphasia or slurred speech. Symmetrical strength, Sensation grossly intact. Alert and oriented X 4.     _______________________________________________________________________  Care Plan discussed with:    Comments   Patient x    Family      RN     Care Manager                    Consultant:      _______________________________________________________________________  Expected  Disposition:   Home with Family x   HH/PT/OT/RN    SNF/LTC    SANJIV    ________________________________________________________________________  TOTAL TIME:  60Minutes    Critical Care Provided     Minutes non procedure based      Comments    x Reviewed previous records   >50% of visit spent in counseling and coordination of care  Discussion with patient and/or family and questions answered       ________________________________________________________________________  Signed: Haja Coronado MD    Procedures: see electronic medical records for all procedures/Xrays and details which were not copied into this note but were reviewed prior to creation of Plan.     LAB DATA REVIEWED: Recent Results (from the past 24 hour(s))   CBC WITH AUTOMATED DIFF    Collection Time: 02/10/17  8:13 PM   Result Value Ref Range    WBC 3.3 (L) 4.1 - 11.1 K/uL    RBC 4.43 4.10 - 5.70 M/uL    HGB 12.5 12.1 - 17.0 g/dL    HCT 39.3 36.6 - 50.3 %    MCV 88.7 80.0 - 99.0 FL    MCH 28.2 26.0 - 34.0 PG    MCHC 31.8 30.0 - 36.5 g/dL    RDW 13.5 11.5 - 14.5 %    PLATELET 16 (LL) 833 - 400 K/uL    NEUTROPHILS 43 %    LYMPHOCYTES 34 %    MONOCYTES 16 %    EOSINOPHILS 5 %    BASOPHILS 2 %    ABS. NEUTROPHILS 1.4 K/UL    ABS. LYMPHOCYTES 1.1 K/UL    ABS. MONOCYTES 0.5 K/UL    ABS. EOSINOPHILS 0.2 K/UL    ABS. BASOPHILS 0.1 K/UL    PLATELET COMMENTS DECREASED PLATELETS      RBC COMMENTS NORMOCYTIC, NORMOCHROMIC     METABOLIC PANEL, COMPREHENSIVE    Collection Time: 02/10/17  8:13 PM   Result Value Ref Range    Sodium 142 136 - 145 mmol/L    Potassium 4.1 3.5 - 5.1 mmol/L    Chloride 107 97 - 108 mmol/L    CO2 28 21 - 32 mmol/L    Anion gap 7 5 - 15 mmol/L    Glucose 92 65 - 100 mg/dL    BUN 22 (H) 6 - 20 MG/DL    Creatinine 1.49 (H) 0.70 - 1.30 MG/DL    BUN/Creatinine ratio 15 12 - 20      GFR est AA 56 (L) >60 ml/min/1.73m2    GFR est non-AA 46 (L) >60 ml/min/1.73m2    Calcium 8.8 8.5 - 10.1 MG/DL    Bilirubin, total 0.4 0.2 - 1.0 MG/DL    ALT (SGPT) 27 12 - 78 U/L    AST (SGOT) 22 15 - 37 U/L    Alk.  phosphatase 50 45 - 117 U/L    Protein, total 7.2 6.4 - 8.2 g/dL    Albumin 3.6 3.5 - 5.0 g/dL    Globulin 3.6 2.0 - 4.0 g/dL    A-G Ratio 1.0 (L) 1.1 - 2.2

## 2017-02-11 NOTE — ED NOTES
Pt arrived ambulatory to room. Pt c/o low blood counts. Pt states his platelets were low after going to his PCP yesterday and today. Pt denies CP, SOB, weakness ,or fatigue. Monitor x 2. Call bell within reach and plan of care discussed.

## 2017-02-11 NOTE — PROGRESS NOTES
Hospitalist Progress Note    NAME: Aniceto Cardenas   :  1940   MRN:  [id number]       Interim Hospital Summary: 68 y.o. male whom presented on 2/10/2017 with      Assessment / Plan:    Acute Thrombocytopenia with neutropenia  -unclear etiology, however differentials include decreased production from hypocellular bone marrow vs increased destruction from immune-mediated pathology, non-immune mediated (DIC, HUS, TTP), vasculitis, abnormal distribution or pooling  -Plt remains at 16 without evidence of bleeding  -peripheral smear  -transfuse for plt <10 or active bleeding  -monitor cbc  -awaiting heme/onc evaluation     Hypertension  -continue arbs, amlodipine     Dyslipidemia  -continue statin     CKD  -cr stable     BPH  -continue finasteride        Code Status: Full  Surrogate Decision Maker: wife Joey Mathew at [phone]     DVT Prophylaxis: pt ambulatory  GI Prophylaxis: not indicated     Baseline: independent               Subjective:     Chief Complaint / Reason for Physician Visit  No acute complaints Discussed with RN events overnight. Review of Systems:  Symptom Y/N Comments  Symptom Y/N Comments   Fever/Chills n   Chest Pain n    Poor Appetite n   Edema n    Cough n   Abdominal Pain n    Sputum    Joint Pain n    SOB/ACEVEDO n   Pruritis/Rash n    Nausea/vomit n   Tolerating PT/OT     Diarrhea    Tolerating Diet     Constipation    Other       Could NOT obtain due to:      Objective:     VITALS:   Last 24hrs VS reviewed since prior progress note.  Most recent are:  Patient Vitals for the past 24 hrs:   Temp Pulse Resp BP SpO2   17 0727 97.9 °F (36.6 °C) (!) 57 16 123/77 97 %   17 0320 98.3 °F (36.8 °C) (!) 58 14 121/75 96 %   02/10/17 2300 - 60 14 (!) 141/91 99 %   02/10/17 2230 - (!) 57 14 132/84 97 %   02/10/17 2145 - (!) 58 - 138/85 99 %   02/10/17 2100 - - - 134/86 99 %   02/10/17 2015 - - - 131/74 100 %   02/10/17 1926 98 °F (36.7 °C) 70 16 - 100 %     No intake or output data in the 24 hours ending 02/11/17 1123     PHYSICAL EXAM:  General: WD, WN. Alert, cooperative, no acute distress    EENT:  EOMI. Anicteric sclerae. MMM  Resp:  CTA bilaterally, no wheezing or rales. No accessory muscle use  CV:  Regular  rhythm,  No edema  GI:  Soft, Non distended, Non tender.  +Bowel sounds  Neurologic:  Alert and oriented X 3, normal speech,   Psych:   Good insight. Not anxious nor agitated  Skin:  No rashes. No jaundice    Reviewed most current lab test results and cultures  YES  Reviewed most current radiology test results   YES  Review and summation of old records today    NO  Reviewed patient's current orders and MAR    YES  PMH/SH reviewed - no change compared to H&P  ________________________________________________________________________  Care Plan discussed with:    Comments   Patient n    Family      RN n    Care Manager     Consultant                        Multidiciplinary team rounds were held today with , nursing, pharmacist and clinical coordinator. Patient's plan of care was discussed; medications were reviewed and discharge planning was addressed. ________________________________________________________________________  Total NON critical care TIME:  45   Minutes    Total CRITICAL CARE TIME Spent:   Minutes non procedure based      Comments   >50% of visit spent in counseling and coordination of care x    ________________________________________________________________________  Ellouise Alpers, MD     Procedures: see electronic medical records for all procedures/Xrays and details which were not copied into this note but were reviewed prior to creation of Plan. LABS:  I reviewed today's most current labs and imaging studies.   Pertinent labs include:  Recent Labs      02/11/17   0242  02/10/17   2013  02/10/17   1526  02/09/17   1242   WBC  3.3*  3.3*   --   3.2*   HGB  11.8*  12.5   --   13.9   HCT  36.6  39.3   --   40.8   PLT  16*  16*  12*  18*     Recent Labs 02/11/17   0242  02/10/17   2013  02/09/17   1242   NA  142  142  142   K  3.6  4.1  4.5   CL  107  107  98   CO2  28  28  28   GLU  124*  92  89   BUN  17  22*  17   CREA  1.32*  1.49*  1.42*   CA  8.2*  8.8  9.6   ALB   --   3.6  4.5   TBILI   --   0.4  0.4   SGOT   --   22  24   ALT   --   27  20       Signed: Osmel David MD

## 2017-02-11 NOTE — ED PROVIDER NOTES
HPI Comments: Wells Ormond. is a 68 y.o. male with a history of hypertension who presents ambulatory from his PCP to [facility] ED for evaluation after labs drawn at his PCP's office today showed a low platelet count. Patient reports he was seen by his PCP for his annual visit, and denies any recent illness. Patient denies any known history of thrombocytopenia. Patient denies any easy bleeding with brushing his teeth but does report recent easy bruising. Patient denies any hematuria, hematochezia, melena, nausea, vomiting, diarrhea, chest pain, abdominal pain, cough, leg swelling, or any other symptoms at this time. PCP: Karen Boss MD    There are no other complaints, changes or physical findings at this time. The history is provided by the patient. Past Medical History:   Diagnosis Date    Hypertension        No past surgical history on file. No family history on file. Social History     Social History    Marital status:      Spouse name: N/A    Number of children: N/A    Years of education: N/A     Occupational History    Not on file. Social History Main Topics    Smoking status: Never Smoker    Smokeless tobacco: Never Used    Alcohol use Yes    Drug use: No    Sexual activity: Yes     Partners: Female     Other Topics Concern    Not on file     Social History Narrative     ALLERGIES: Lisinopril    Review of Systems   Constitutional: Negative for chills, fatigue and fever. Positive for low platelet count   HENT: Negative. Negative for congestion, rhinorrhea and sore throat. Eyes: Negative. Negative for pain, discharge and visual disturbance. Respiratory: Negative. Negative for cough, chest tightness, shortness of breath and wheezing. Cardiovascular: Negative. Negative for chest pain, palpitations and leg swelling. Gastrointestinal: Negative. Negative for abdominal pain, blood in stool, constipation, diarrhea, nausea and vomiting.    Endocrine: Negative. Genitourinary: Negative. Negative for dysuria, frequency and hematuria. Musculoskeletal: Negative. Negative for arthralgias, back pain and myalgias. Skin: Negative. Negative for rash. Allergic/Immunologic: Negative. Neurological: Negative. Negative for dizziness, weakness, light-headedness and headaches. Hematological: Bruises/bleeds easily. Psychiatric/Behavioral: Negative. All other systems reviewed and are negative. Patient Vitals for the past 12 hrs:   Temp Pulse Resp BP SpO2   02/10/17 2015 - - - 131/74 100 %   02/10/17 1926 98 °F (36.7 °C) 70 16 - 100 %      Physical Exam   Constitutional: He is oriented to person, place, and time. He appears well-developed and well-nourished. No distress. HENT:   Head: Normocephalic and atraumatic. Eyes: EOM are normal. Right eye exhibits no discharge. Left eye exhibits no discharge. No scleral icterus. Neck: Normal range of motion. Neck supple. No tracheal deviation present. Cardiovascular: Normal rate, regular rhythm, normal heart sounds and intact distal pulses. Exam reveals no gallop and no friction rub. No murmur heard. Pulmonary/Chest: Effort normal and breath sounds normal. No respiratory distress. He has no wheezes. He has no rales. Abdominal: Soft. He exhibits no distension. There is no tenderness. Musculoskeletal: Normal range of motion. He exhibits edema (1+ BLLE). Lymphadenopathy:     He has no cervical adenopathy. Neurological: He is alert and oriented to person, place, and time. Skin: Skin is warm and dry. No rash noted. Psychiatric: He has a normal mood and affect. Nursing note and vitals reviewed. MDM  Number of Diagnoses or Management Options  Thrombocytopenia [facility]):   Diagnosis management comments:     Patient presents to ED with acute thrombocytopenia, platelet count 16. No prior history of thrombocytopenia. No active bleeding. No indication for platelet transfusion at this time.   Will admit for further management. Amount and/or Complexity of Data Reviewed  Clinical lab tests: ordered and reviewed  Review and summarize past medical records: yes  Discuss the patient with other providers: yes (Hospitalist)    Patient Progress  Patient progress: stable      Procedures     Consult Note:  9:19 PM  Massiel Rushing MD spoke with Dr. Brianna Le,   Specialty: Hospitalist  Discussed patient's history, disposition, and available diagnostic and imaging results. Reviewed care plans. Consultant will evaluate patient for admission. LABORATORY TESTS:  Recent Results (from the past 12 hour(s))   CBC WITH AUTOMATED DIFF    Collection Time: 02/10/17  8:13 PM   Result Value Ref Range    WBC 3.3 (L) 4.1 - 11.1 K/uL    RBC 4.43 4.10 - 5.70 M/uL    HGB 12.5 12.1 - 17.0 g/dL    HCT 39.3 36.6 - 50.3 %    MCV 88.7 80.0 - 99.0 FL    MCH 28.2 26.0 - 34.0 PG    MCHC 31.8 30.0 - 36.5 g/dL    RDW 13.5 11.5 - 14.5 %    PLATELET 16 (LL) 618 - 400 K/uL    NEUTROPHILS 43 %    LYMPHOCYTES 34 %    MONOCYTES 16 %    EOSINOPHILS 5 %    BASOPHILS 2 %    ABS. NEUTROPHILS 1.4 K/UL    ABS. LYMPHOCYTES 1.1 K/UL    ABS. MONOCYTES 0.5 K/UL    ABS. EOSINOPHILS 0.2 K/UL    ABS. BASOPHILS 0.1 K/UL    PLATELET COMMENTS DECREASED PLATELETS      RBC COMMENTS NORMOCYTIC, NORMOCHROMIC     METABOLIC PANEL, COMPREHENSIVE    Collection Time: 02/10/17  8:13 PM   Result Value Ref Range    Sodium 142 136 - 145 mmol/L    Potassium 4.1 3.5 - 5.1 mmol/L    Chloride 107 97 - 108 mmol/L    CO2 28 21 - 32 mmol/L    Anion gap 7 5 - 15 mmol/L    Glucose 92 65 - 100 mg/dL    BUN 22 (H) 6 - 20 MG/DL    Creatinine 1.49 (H) 0.70 - 1.30 MG/DL    BUN/Creatinine ratio 15 12 - 20      GFR est AA 56 (L) >60 ml/min/1.73m2    GFR est non-AA 46 (L) >60 ml/min/1.73m2    Calcium 8.8 8.5 - 10.1 MG/DL    Bilirubin, total 0.4 0.2 - 1.0 MG/DL    ALT (SGPT) 27 12 - 78 U/L    AST (SGOT) 22 15 - 37 U/L    Alk.  phosphatase 50 45 - 117 U/L    Protein, total 7.2 6.4 - 8.2 g/dL Albumin 3.6 3.5 - 5.0 g/dL    Globulin 3.6 2.0 - 4.0 g/dL    A-G Ratio 1.0 (L) 1.1 - 2.2         IMPRESSION:  1. Thrombocytopenia ([address]        PLAN:  1. Admit to the main hospital    Admit Note:  9:19 PM  Patient is being admitted to the hospital by Dr. Korin France. The results of their tests and reasons for their admission has been discussed with the patient and/or available family. They convey agreement and understanding for the need to be admitted and for their admission diagnosis. This note is prepared by Jose M Quinn, acting as Scribe for Evaristo Zarate MD.    Evaristo Zarate MD: The scribe's documentation has been prepared under my direction and personally reviewed by me in its entirety. I confirm that the note above accurately reflects all work, treatment, procedures, and medical decision making performed by me.

## 2017-02-11 NOTE — ROUTINE PROCESS
22:47 ER called for report on patient coming to room 3107. Primary care nurse in the ER is off the floor and cannot take call. When she returns to ER, message will be relayed to Primary RN to return my call .

## 2017-02-11 NOTE — ROUTINE PROCESS
TRANSFER - OUT REPORT:    Verbal report given to Deyanira Jordan on [address]  being transferred to NSTU(unit) for routine progression of care       Report consisted of patients Situation, Background, Assessment and   Recommendations(SBAR). Information from the following report(s) SBAR, ED Summary, [facility] and Recent Results was reviewed with the receiving nurse. Lines:   Peripheral IV 02/10/17 Right Antecubital (Active)   Site Assessment Clean, dry, & intact 2/10/2017  8:21 PM   Phlebitis Assessment 0 2/10/2017  8:21 PM   Infiltration Assessment 0 2/10/2017  8:21 PM   Dressing Status Clean, dry, & intact 2/10/2017  8:21 PM   Dressing Type Transparent 2/10/2017  8:21 PM   Hub Color/Line Status Pink 2/10/2017  8:21 PM        Opportunity for questions and clarification was provided.       Patient transported with:

## 2017-02-11 NOTE — PROGRESS NOTES
Primary Nurse Angelito Raman RN and Rachana RN performed a dual skin assessment on this patient Impairment noted between right big toe healed area and bilateral lower legs skin is  dry and flaky.   Javi score is 21

## 2017-02-12 LAB
ANION GAP BLD CALC-SCNC: 8 MMOL/L (ref 5–15)
BASOPHILS # BLD AUTO: 0 K/UL (ref 0–0.1)
BASOPHILS # BLD: 1 % (ref 0–1)
BUN SERPL-MCNC: 15 MG/DL (ref 6–20)
BUN/CREAT SERPL: 12 (ref 12–20)
CALCIUM SERPL-MCNC: 8.6 MG/DL (ref 8.5–10.1)
CHLORIDE SERPL-SCNC: 106 MMOL/L (ref 97–108)
CO2 SERPL-SCNC: 26 MMOL/L (ref 21–32)
CREAT SERPL-MCNC: 1.3 MG/DL (ref 0.7–1.3)
DIFFERENTIAL METHOD BLD: ABNORMAL
EOSINOPHIL # BLD: 0.1 K/UL (ref 0–0.4)
EOSINOPHIL NFR BLD: 4 % (ref 0–7)
ERYTHROCYTE [DISTWIDTH] IN BLOOD BY AUTOMATED COUNT: 13.3 % (ref 11.5–14.5)
FERRITIN SERPL-MCNC: 53 NG/ML (ref 26–388)
FOLATE SERPL-MCNC: 22.8 NG/ML (ref 5–21)
GLUCOSE SERPL-MCNC: 100 MG/DL (ref 65–100)
HCT VFR BLD AUTO: 38 % (ref 36.6–50.3)
HGB BLD-MCNC: 12.4 G/DL (ref 12.1–17)
IRON SATN MFR SERPL: 23 % (ref 20–50)
IRON SERPL-MCNC: 73 UG/DL (ref 35–150)
LYMPHOCYTES # BLD AUTO: 43 % (ref 12–49)
LYMPHOCYTES # BLD: 1.4 K/UL (ref 0.8–3.5)
MCH RBC QN AUTO: 28.6 PG (ref 26–34)
MCHC RBC AUTO-ENTMCNC: 32.6 G/DL (ref 30–36.5)
MCV RBC AUTO: 87.8 FL (ref 80–99)
MONOCYTES # BLD: 0.3 K/UL (ref 0–1)
MONOCYTES NFR BLD AUTO: 10 % (ref 5–13)
NEUTS SEG # BLD: 1.5 K/UL (ref 1.8–8)
NEUTS SEG NFR BLD AUTO: 42 % (ref 32–75)
PLATELET # BLD AUTO: 18 K/UL (ref 150–400)
POTASSIUM SERPL-SCNC: 3.8 MMOL/L (ref 3.5–5.1)
RBC # BLD AUTO: 4.33 M/UL (ref 4.1–5.7)
RBC MORPH BLD: ABNORMAL
RETICS/RBC NFR AUTO: 0.5 % (ref 0.7–2.1)
SODIUM SERPL-SCNC: 140 MMOL/L (ref 136–145)
TIBC SERPL-MCNC: 311 UG/DL (ref 250–450)
VIT B12 SERPL-MCNC: 1244 PG/ML (ref 211–911)
WBC # BLD AUTO: 3.3 K/UL (ref 4.1–11.1)

## 2017-02-12 PROCEDURE — 85025 COMPLETE CBC W/AUTO DIFF WBC: CPT | Performed by: INTERNAL MEDICINE

## 2017-02-12 PROCEDURE — 99218 HC RM OBSERVATION: CPT

## 2017-02-12 PROCEDURE — 82728 ASSAY OF FERRITIN: CPT | Performed by: INTERNAL MEDICINE

## 2017-02-12 PROCEDURE — 85045 AUTOMATED RETICULOCYTE COUNT: CPT | Performed by: INTERNAL MEDICINE

## 2017-02-12 PROCEDURE — 82746 ASSAY OF FOLIC ACID SERUM: CPT | Performed by: INTERNAL MEDICINE

## 2017-02-12 PROCEDURE — 80048 BASIC METABOLIC PNL TOTAL CA: CPT | Performed by: INTERNAL MEDICINE

## 2017-02-12 PROCEDURE — 83540 ASSAY OF IRON: CPT | Performed by: INTERNAL MEDICINE

## 2017-02-12 PROCEDURE — 36415 COLL VENOUS BLD VENIPUNCTURE: CPT | Performed by: INTERNAL MEDICINE

## 2017-02-12 PROCEDURE — 74011250637 HC RX REV CODE- 250/637: Performed by: INTERNAL MEDICINE

## 2017-02-12 PROCEDURE — 65660000000 HC RM CCU STEPDOWN

## 2017-02-12 PROCEDURE — 82607 VITAMIN B-12: CPT | Performed by: INTERNAL MEDICINE

## 2017-02-12 PROCEDURE — 74011636637 HC RX REV CODE- 636/637: Performed by: INTERNAL MEDICINE

## 2017-02-12 RX ORDER — PREDNISONE 20 MG/1
60 TABLET ORAL
Status: DISCONTINUED | OUTPATIENT
Start: 2017-02-13 | End: 2017-02-12

## 2017-02-12 RX ORDER — PREDNISONE 20 MG/1
60 TABLET ORAL
Status: DISCONTINUED | OUTPATIENT
Start: 2017-02-12 | End: 2017-02-13 | Stop reason: HOSPADM

## 2017-02-12 RX ADMIN — AMLODIPINE BESYLATE 5 MG: 5 TABLET ORAL at 08:32

## 2017-02-12 RX ADMIN — FINASTERIDE 5 MG: 5 TABLET, FILM COATED ORAL at 08:32

## 2017-02-12 RX ADMIN — VALSARTAN 160 MG: 160 TABLET ORAL at 14:05

## 2017-02-12 RX ADMIN — Medication 10 ML: at 22:02

## 2017-02-12 RX ADMIN — Medication 10 ML: at 14:04

## 2017-02-12 RX ADMIN — PREDNISONE 60 MG: 20 TABLET ORAL at 11:40

## 2017-02-12 RX ADMIN — ATORVASTATIN CALCIUM 20 MG: 10 TABLET, FILM COATED ORAL at 22:01

## 2017-02-12 RX ADMIN — Medication 10 ML: at 03:25

## 2017-02-12 NOTE — PROGRESS NOTES
Feels good. Wants to go home. No bleeding    Afebrile. Lungs clear. Heart RRR. Abd benign. Skin no bruising    Plts 18K  WBC 2.8    Thrombocytosis  Leukopenia  HTN  BPH    Per heme-onc.

## 2017-02-12 NOTE — PROGRESS NOTES
This is a 68 yr old male who presented ambulatory from his PCP office for evaluation following abnormal labs. Patient has been admitted and diagnosed with acute thrombocytopenia with neutropenia. Patient reports independence and lives with his spouse. CM to follow for assessment of discharge needs however non anticipated. Care Management Interventions  PCP Verified by CM:  Yes  Discharge Durable Medical Equipment: No  Physical Therapy Consult: No  Occupational Therapy Consult: No  Speech Therapy Consult: No  Current Support Network: Lives with Spouse  Confirm Follow Up Transport: Family  Plan discussed with Pt/Family/Caregiver: Yes     Ex 0522

## 2017-02-12 NOTE — PROGRESS NOTES
Hematology / Oncology  Consultation      Damon Thiells    Referring:    Reason for consultation: severe thrombocytopenia    Consulting: Nancy Damon MD    ASSESSMENT:  Chief complaint  :Acute Thrombocytopenia 16k  with neutropenia  New problem, associated with low WBC, normal LDH makes ITP/TTP/HUS less likely, w/up ordered. R/o toxic, metabolic, deficiency, infectious,  marrow failure syndrome,DIC, autoimmune disease associated  -no evidence of bleeding  -peripheral smear  -transfuse for plt <10 or active bleeding  -monitor cbc for stability    Other issues:  Hypertension    Dyslipidemia  CKD      RECOMMENDATIONS:  Lab evaluation  Treatment trial with prednisone  If stable CBC can be released tomorrow evening. [facility] DVT prophylaxis      HPI:  67 y/o AA M presented for yearly physical to PCP and was noted to have low platelets. Sent to ER for that and admitted. Of note is he feels fine. No bleeding, no abdominal, chest or skeletal pains. No weight changes or constitutional symptoms, lumps or h/o recent illness, antibiotic use or new medications. Admission plt count 97786, WBC 3.3, ANC  1.4    We discussed the ddx of low platelets. His LDH is normal which makes TTP which may be the biggest concern unlikley. We discussed lab work, US abd and possible BM biopsy, Treatment trial with prednisone, but slightly low WBC/ANC could also reflect BM failure syndrome. We need to observe if his platelet count is stable, before he could be transferred to outpatient work up for that, because it is severely decreased. Denies excessive ETOH use. No h/o liver disease. ROS x 10 negative otherwise. The remainder of a 12 point review of system was negative. I discussed the patient with the RN   The hospital record was reviewed. Imaging studies reviewed:  Will review smear    Recent Labs      02/11/17   1433  02/11/17   0242  02/10/17   2013   WBC  2.8*  3.3*  3.3*   GRANS  49  39  43   HGB  12.3 11.8*  12.5   PLT  19*  16*  16*   NA   --   142  142   K   --   3.6  4.1   GLU   --   124*  92   BUN   --   17  22*   CREA   --   1.32*  1.49*   ALT   --    --   27   SGOT   --    --   22   TBILI   --    --   0.4   AP   --    --   50          Current Medications:  Current Facility-Administered Medications   Medication Dose Route Frequency    [START ON 2/12/2017] valsartan (DIOVAN) tablet 160 mg  160 mg Oral DAILY    [START ON 2/12/2017] influenza vaccine 2016-17 (36mos+)(PF) (FLUZONE/FLUARIX/FLULAVAL QUAD) injection 0.5 mL  0.5 mL IntraMUSCular PRIOR TO DISCHARGE    sodium chloride (NS) flush 5-10 mL  5-10 mL IntraVENous Q8H    sodium chloride (NS) flush 5-10 mL  5-10 mL IntraVENous PRN    acetaminophen (TYLENOL) tablet 650 mg  650 mg Oral Q4H PRN    ondansetron (ZOFRAN) injection 4 mg  4 mg IntraVENous Q4H PRN    bisacodyl (DULCOLAX) tablet 5 mg  5 mg Oral DAILY PRN    atorvastatin (LIPITOR) tablet 20 mg  20 mg Oral QHS    amLODIPine (NORVASC) tablet 5 mg  5 mg Oral DAILY    finasteride (PROSCAR) tablet 5 mg  5 mg Oral DAILY       Allergies   Allergen Reactions    Lisinopril Swelling       PMH:  Past Medical History   Diagnosis Date    Hypertension        Family History:  History reviewed. No pertinent family history. Negativve for hematological d/o or malignancy    Social History:  Social History     Social History    Marital status:      Spouse name: N/A    Number of children: N/A    Years of education: N/A     Occupational History    Not on file. Social History Main Topics    Smoking status: Never Smoker    Smokeless tobacco: Never Used    Alcohol use Yes    Drug use: No    Sexual activity: Yes     Partners: Female     Other Topics Concern    Not on file     Social History Narrative       Medications prior to admission:  No current facility-administered medications on file prior to encounter.       Current Outpatient Prescriptions on File Prior to Encounter   Medication Sig Dispense Refill    sildenafil citrate (VIAGRA) 100 mg tablet TAKE 1 TABLET BY MOUTH AS NEEDED 7 Tab 11    simvastatin (ZOCOR) 40 mg tablet Take 1 Tab by mouth nightly. 30 Tab 11    losartan (COZAAR) 100 mg tablet Take 1 Tab by mouth daily. 30 Tab 11         Exam:     General appearance  Alert,  no distress, well nourished   Eyes  Conjunctivae clear. Lids normal. Anicteric   ENT No thrush; no mucositis   Lungs  Clear to auscultation bilaterally   Chest wall  No deformities   Heart  Regular rate and rhythm, edema absent   Abdomen  Soft and non-tender; no masses, bowel sounds present, no distension   Extremities Symmetric, no clubbing, cyanosis. Skin No rash, no bruises   Neurologic Alert, oriented, moves all extremities, normal speech, ambulatory   Psychatric: Cooperative, verbalized understanding of our discussion, thought processes are clear, questions asked are appropriate. Musculoskeletal No joint deformities, no musclewasting. Line sites No bleeding, no tenderness.              Treating physicians: Treatment Team: Attending Provider: Sangita Singer MD; Consulting Provider: John Barnes MD; Consulting Provider: Justino Olivas MD; PCP - Health System Attribution Model: Sangita Singer MD    Total time >50 % spent on floor with counseling and coordination of care:

## 2017-02-13 VITALS
BODY MASS INDEX: 21.08 KG/M2 | HEIGHT: 72 IN | TEMPERATURE: 98.1 F | DIASTOLIC BLOOD PRESSURE: 70 MMHG | WEIGHT: 155.65 LBS | RESPIRATION RATE: 16 BRPM | HEART RATE: 69 BPM | OXYGEN SATURATION: 98 % | SYSTOLIC BLOOD PRESSURE: 121 MMHG

## 2017-02-13 LAB
ANA SER QL: NEGATIVE
BASOPHILS # BLD AUTO: 0 K/UL
BASOPHILS # BLD: 0 %
BLASTS NFR BLD: 0 %
DIFFERENTIAL METHOD BLD: ABNORMAL
EOSINOPHIL # BLD: 0 K/UL
EOSINOPHIL NFR BLD: 0 %
ERYTHROCYTE [DISTWIDTH] IN BLOOD BY AUTOMATED COUNT: 13.2 % (ref 11.5–14.5)
FIBRINOGEN PPP-MCNC: 240 MG/DL (ref 200–475)
HCT VFR BLD AUTO: 39.7 % (ref 36.6–50.3)
HGB BLD-MCNC: 13 G/DL (ref 12.1–17)
LYMPHOCYTES # BLD AUTO: 20 %
LYMPHOCYTES # BLD: 1.3 K/UL
MANUAL DIFFERENTIAL PERFORMED BLD QL: ABNORMAL
MCH RBC QN AUTO: 28.4 PG (ref 26–34)
MCHC RBC AUTO-ENTMCNC: 32.7 G/DL (ref 30–36.5)
MCV RBC AUTO: 86.7 FL (ref 80–99)
METAMYELOCYTES NFR BLD MANUAL: 0 %
MONOCYTES # BLD: 0.3 K/UL
MONOCYTES NFR BLD AUTO: 5 %
MYELOCYTES NFR BLD MANUAL: 0 %
NEUTS BAND NFR BLD MANUAL: 0 %
NEUTS SEG # BLD: 5.1 K/UL
NEUTS SEG NFR BLD AUTO: 75 %
NRBC # BLD: 0 K/UL (ref 0–0.01)
NRBC BLD-RTO: 0 PER 100 WBC
PLATELET # BLD AUTO: 31 K/UL (ref 150–400)
PROMYELOCYTES NFR BLD MANUAL: 0 %
RBC # BLD AUTO: 4.58 M/UL (ref 4.1–5.7)
RBC MORPH BLD: ABNORMAL
SEE BELOW:, 164879: NORMAL
WBC # BLD AUTO: 6.7 K/UL (ref 4.1–11.1)
WBC OTHER # BLD MANUAL: 0 10*3/UL

## 2017-02-13 PROCEDURE — 74011250637 HC RX REV CODE- 250/637: Performed by: INTERNAL MEDICINE

## 2017-02-13 PROCEDURE — 36415 COLL VENOUS BLD VENIPUNCTURE: CPT | Performed by: INTERNAL MEDICINE

## 2017-02-13 PROCEDURE — 65270000029 HC RM PRIVATE

## 2017-02-13 PROCEDURE — 74011636637 HC RX REV CODE- 636/637: Performed by: INTERNAL MEDICINE

## 2017-02-13 PROCEDURE — 87389 HIV-1 AG W/HIV-1&-2 AB AG IA: CPT | Performed by: INTERNAL MEDICINE

## 2017-02-13 PROCEDURE — 99218 HC RM OBSERVATION: CPT

## 2017-02-13 PROCEDURE — 85027 COMPLETE CBC AUTOMATED: CPT | Performed by: INTERNAL MEDICINE

## 2017-02-13 PROCEDURE — 85384 FIBRINOGEN ACTIVITY: CPT | Performed by: INTERNAL MEDICINE

## 2017-02-13 RX ORDER — PREDNISONE 20 MG/1
60 TABLET ORAL DAILY
Qty: 60 TAB | Refills: 0 | Status: SHIPPED | OUTPATIENT
Start: 2017-02-13 | End: 2018-02-08 | Stop reason: ALTCHOICE

## 2017-02-13 RX ADMIN — Medication 10 ML: at 13:55

## 2017-02-13 RX ADMIN — Medication 10 ML: at 07:06

## 2017-02-13 RX ADMIN — AMLODIPINE BESYLATE 5 MG: 5 TABLET ORAL at 08:47

## 2017-02-13 RX ADMIN — VALSARTAN 160 MG: 160 TABLET ORAL at 15:57

## 2017-02-13 RX ADMIN — FINASTERIDE 5 MG: 5 TABLET, FILM COATED ORAL at 08:47

## 2017-02-13 RX ADMIN — PREDNISONE 60 MG: 20 TABLET ORAL at 08:47

## 2017-02-13 NOTE — PROGRESS NOTES
* No surgery found *  Bedside and Verbal shift change report given to Lizzy Kirk (oncoming nurse) by Cynda Halsted (offgoing nurse). Report included the following information SBAR, Kardex, Procedure Summary, Intake/Output, MAR and Recent Results.     Zone Phone: [phone]        Significant changes during shift: None           Patient Information     Estelita Nederland.  68 y.o.  2/10/2017 7:53 PM by Kaylan Telles MD. Johanne Newsome . was admitted from Home     Problem List          Patient Active Problem List     Diagnosis Date Noted    Thrombocytopenia ([facility] [address] 02/10/2017    Proteinuria 02/09/2017    Dermatitis 10/01/2015    Hypertension 10/14/2014    Prostatism 10/14/2014    Dyslipidemia 10/14/2014           Past Medical History   Diagnosis Date    Hypertension              Post Op Surgical (If Applicable):      NA     Activity Status:     OOB to Chair No  Ambulated this shift Yes   Bed Rest No     Supplemental O2: (If Applicable)     NA        LINES AND DRAINS:     20 R AC IVL     DVT prophylaxis:     DVT prophylaxis Med- No  DVT prophylaxis SCD or MARK- No      Wounds: (If Applicable)     NONE     Patient Safety:     Falls Score Total Score: 1  Safety Level_______  Bed Alarm On? No  Sitter?  No     Plan for upcoming shift: Oncology consult to evaluate           Discharge Plan: Yes when medically cleared      Active Consults:  IP CONSULT TO HEMATOLOGY

## 2017-02-13 NOTE — PROGRESS NOTES
Hematology-Oncology Progress Note      Di Books Sr.  1940  [id number]      2/13/2017  1:50 PM    Follow-up for: ITP   [x] Chart notes since last visit reviewed   [x] Medications reviewed for allergies and interactions    Case discussed with following: nursing staffSilvia hewitt    Patient complains of the following: weakness      Subjective:     12 point ROS negative except as in HPI and above    Spoke with patient who notes the following:     [] Anorexia   [] Nausea   [] Vomiting   [] Weakness   [] Dyspnea       [] Constipation   [] Diarrhea   [] Hemoptysis   [] Dysphagia       [] Cough Productive   [] Cough Non-productive     [] Pain asses  n    Objective:     Patient Vitals for the past 24 hrs:   BP Temp Pulse Resp SpO2   02/13/17 1057 113/67 97.9 °F (36.6 °C) 63 17 96 %   02/13/17 0811 120/69 97.9 °F (36.6 °C) (!) 58 16 96 %   02/13/17 0316 101/64 98.1 °F (36.7 °C) 73 18 97 %   02/12/17 2216 126/79 98.5 °F (36.9 °C) 87 16 96 %   02/12/17 1930 125/75 98.2 °F (36.8 °C) 88 18 95 %   02/12/17 1503 119/76 98.7 °F (37.1 °C) 71 16 97 %         Constitutional: Patient looks  [] Sick    [] Frail   [x] Better  [] In distress                                                [] Depressed   [] Tearful     [] Content    HEENT:  [] NC  [] AT  [] alopecious  [] temporal  wasting    Eyes: [] Normal [] Icteric    Oropharynx: [] Normal [] Thrush [] Dry    Mucositis: [x] None  Or present at grade  [] I  [] II  [] III  [] IV    Neck:   [x] Supple   [] Rigid    [] Thyromegaly  Lymphadenopathy: [] anterior Cervical  [] posterior Cervical [] Supraclav [] Infraclav  [] Axillary   [] Inguinal   [] None Noted    Chest: [x] Clear [] Rhonchi [] Rales  [] Wheezes      [x] Dec'd @  [] Right Base /  [] Left Base    CV: [x] Regular [] Irregular [] Tachy [] [place] []Murmur    Abdominal:   [x] Soft    []Distender    [] Tender     [] Nontender  BS:    [] +    [] -    [] Hyper    [] Hypo    Liver: [] Edge palp [] Non-palp    Spleen: [] Edge palp [] Non-palp    Mass: [] Present [] Absent    Extr: [] Lymphedema      [] Cyanosis      [] Clubbing    Edema: [x] None [] Right [] Left    Skin: [x] Intact [] Decubiti [] Petechiae [] Purpura [] Ecchymoses    Rash: [] Present [x] Absent    Neuro: [x] Normal [] Confused [] Lethargic [] Obtunded  [] Reflexes     PSY             Good insight into disease    Available labs reviewed:  Labs:    Recent Results (from the past 24 hour(s))   IRON PROFILE    Collection Time: 02/12/17  6:44 PM   Result Value Ref Range    Iron 73 35 - 150 ug/dL    TIBC 311 250 - 450 ug/dL    Iron % saturation 23 20 - 50 %   RETICULOCYTE COUNT    Collection Time: 02/12/17  6:44 PM   Result Value Ref Range    Reticulocyte count 0.5 (L) 0.7 - 2.1 %   FIBRINOGEN    Collection Time: 02/13/17  9:15 AM   Result Value Ref Range    Fibrinogen 240 200 - 475 mg/dL   CBC WITH MANUAL DIFF    Collection Time: 02/13/17  9:15 AM   Result Value Ref Range    WBC 6.7 4.1 - 11.1 K/uL    RBC 4.58 4.10 - 5.70 M/uL    HGB 13.0 12.1 - 17.0 g/dL    HCT 39.7 36.6 - 50.3 %    MCV 86.7 80.0 - 99.0 FL    MCH 28.4 26.0 - 34.0 PG    MCHC 32.7 30.0 - 36.5 g/dL    RDW 13.2 11.5 - 14.5 %    PLATELET 31 (LL) 850 - 400 K/uL    NEUTROPHILS 75 %    BAND NEUTROPHILS 0 %    LYMPHOCYTES 20 %    MONOCYTES 5 %    EOSINOPHILS 0 %    BASOPHILS 0 %    METAMYELOCYTES 0 %    MYELOCYTES 0 %    PROMYELOCYTES 0 %    BLASTS 0 %    OTHER CELL 0      ABS. NEUTROPHILS 5.1 K/UL    ABS. LYMPHOCYTES 1.3 K/UL    ABS. MONOCYTES 0.3 K/UL    ABS. EOSINOPHILS 0.0 K/UL    ABS. BASOPHILS 0.0 K/UL    RBC COMMENTS NORMOCYTIC, NORMOCHROMIC      DF MANUAL      NRBC 0.0 0  WBC    ABSOLUTE NRBC 0.00 0.00 - 0.01 K/uL    DIFFERENTIAL MANUAL DIFFERENTIAL ORDERED         Imaging:  CXR Results  (Last 48 hours)    None        CT Results  (Last 48 hours)    None          Chemotherapy monitored and toxicities assessed:    Time spent with family    Assessment:       1. ITP ? May need marrow wife had ITP then CLL  2. Plan:     U/S   Continue steroid will see inweeks

## 2017-02-13 NOTE — ROUTINE PROCESS
No surgery found *  Bedside and Verbal shift change report given to Kathryn (oncoming nurse) by Hilda Hurd (offgoing nurse). Report included the following information SBAR, Kardex, Procedure Summary, Intake/Output, MAR and Recent Results.     Zone Phone: [phone]        Significant changes during shift: None           Patient Information     Crystal Gonzalez.  68 y.o.  2/10/2017 7:53 PM by Santosh Jovel MD. Brice Ogden . was admitted from Home     Problem List          Patient Active Problem List     Diagnosis Date Noted    Thrombocytopenia ([facility] [address] 02/10/2017    Proteinuria 02/09/2017    Dermatitis 10/01/2015    Hypertension 10/14/2014    Prostatism 10/14/2014    Dyslipidemia 10/14/2014           Past Medical History   Diagnosis Date    Hypertension              Post Op Surgical (If Applicable):      NA     Activity Status:     OOB to Chair No  Ambulated this shift Yes   Bed Rest No     Supplemental O2: (If Applicable)     NA        LINES AND DRAINS:     20 R AC IVL     DVT prophylaxis:     DVT prophylaxis Med- No  DVT prophylaxis SCD or MARK- No      Wounds: (If Applicable)     NONE     Patient Safety:     Falls Score Total Score: 1  Safety Level_______  Bed Alarm On? No  Sitter?  No     Plan for upcoming shift: discharge?           Discharge Plan: Yes when medically cleared      Active Consults:  IP CONSULT TO HEMATOLOGY

## 2017-02-13 NOTE — PROGRESS NOTES
1825: Discharge instructions/new prescriptions discussed with pt. Tele removed. Pt dressed and packed. Pt waiting on his ride. Will remove IV when ride is here. 1855:  Pt left via wheelchair with all personal belongings.

## 2017-02-13 NOTE — PROGRESS NOTES
Hematology / Oncology  Consultation      [place]    Referring: hospitalist [facility]    Reason for consultation: severe thrombocytopenia    Consulting: Leatha Ibanez MD    ASSESSMENT:  Chief complaint  Acute Thrombocytopenia 16k  with mild neutropenia   Pt unaware of that and his low WBC, normal LDH makes ITP/TTP/HUS less likely. 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 .   R/o toxic, metabolic, deficiency, infectious,  marrow failure syndrome,DIC, autoimmune disease associated  -no evidence of bleeding  -transfuse for plt <10 or active bleeding  -monitor cbc for stability so far stable. SMEAR:  No schistocytosis, mild-mod anisocytosis, mod poikilocytosis with teardrops, ovalocytes, no target cells. Platelets small and rare, WBC no gross anormalities, no immature forms. Other issues:  Hypertension    Dyslipidemia  CKD      RECOMMENDATIONS:  Lab evaluation  Treatment trial with prednisone  If stable CBC can be released with close OP follow up, will likely need bone marrow biopsy. [facility] DVT prophylaxis      HPI:  67 y/o AA M presented for yearly physical to PCP and was noted to have low platelets. Sent to ER for that and admitted. Of note is he feels fine. No bleeding, no abdominal, chest or skeletal pains. No weight changes or constitutional symptoms, lumps or h/o recent illness, antibiotic use or new medications. Admission plt count 09567, WBC 3.3, ANC  1.4    Work up sign for low reticulocyte count at 0.5. Normal iron, high B12, normal folate, Normal LDH, YOSVANY/hep/HIV  pending     We discussed again  the ddx of low platelets. His LDH is normal which makes TTP which may be the biggest concern unlikley. We discussed lab work, US abd and possible BM biopsy, Treatment trial with prednisone, but slightly low WBC/ANC could also reflect BM failure syndrome.      We need to observe if his platelet count is stable, before he could be transferred to outpatient work up for that, because it is severely decreased. Denies excessive ETOH use. No h/o liver disease. ROS x 10 negative otherwise. The hospital record was reviewed. Imaging studies reviewed: Will review smear    Recent Labs      02/12/17   0321   02/10/17   2013   WBC  3.3*   < >  3.3*   GRANS  42   < >  43   HGB  12.4   < >  12.5   PLT  18*   < >  16*   NA  140   < >  142   K  3.8   < >  4.1   GLU  100   < >  92   BUN  15   < >  22*   CREA  1.30   < >  1.49*   ALT   --    --   27   SGOT   --    --   22   TBILI   --    --   0.4   AP   --    --   50    < > = values in this interval not displayed. Current Medications:  Current Facility-Administered Medications   Medication Dose Route Frequency    predniSONE (DELTASONE) tablet 60 mg  60 mg Oral DAILY WITH BREAKFAST    valsartan (DIOVAN) tablet 160 mg  160 mg Oral DAILY    influenza vaccine 2016-17 (36mos+)(PF) (FLUZONE/FLUARIX/FLULAVAL QUAD) injection 0.5 mL  0.5 mL IntraMUSCular PRIOR TO DISCHARGE    sodium chloride (NS) flush 5-10 mL  5-10 mL IntraVENous Q8H    sodium chloride (NS) flush 5-10 mL  5-10 mL IntraVENous PRN    acetaminophen (TYLENOL) tablet 650 mg  650 mg Oral Q4H PRN    ondansetron (ZOFRAN) injection 4 mg  4 mg IntraVENous Q4H PRN    bisacodyl (DULCOLAX) tablet 5 mg  5 mg Oral DAILY PRN    atorvastatin (LIPITOR) tablet 20 mg  20 mg Oral QHS    amLODIPine (NORVASC) tablet 5 mg  5 mg Oral DAILY    finasteride (PROSCAR) tablet 5 mg  5 mg Oral DAILY       Allergies   Allergen Reactions    Lisinopril Swelling       PMH:  Past Medical History   Diagnosis Date    Hypertension        Family History:  History reviewed. No pertinent family history. Negativve for hematological d/o or malignancy    Social History:  Social History     Social History    Marital status:      Spouse name: N/A    Number of children: N/A    Years of education: N/A     Occupational History    Not on file. Social History Main Topics    Smoking status: Never Smoker    Smokeless tobacco: Never Used    Alcohol use Yes    Drug use: No    Sexual activity: Yes     Partners: Female     Other Topics Concern    Not on file     Social History Narrative       Medications prior to admission:  No current facility-administered medications on file prior to encounter. Current Outpatient Prescriptions on File Prior to Encounter   Medication Sig Dispense Refill    sildenafil citrate (VIAGRA) 100 mg tablet TAKE 1 TABLET BY MOUTH AS NEEDED 7 Tab 11    simvastatin (ZOCOR) 40 mg tablet Take 1 Tab by mouth nightly. 30 Tab 11    losartan (COZAAR) 100 mg tablet Take 1 Tab by mouth daily. 30 Tab 11         Exam:     General appearance  Alert,  no distress, well nourished   Eyes  Conjunctivae clear. Lids normal. Anicteric   ENT No thrush; no mucositis   Lungs  No distress, nichole equal expansion   Chest wall  No deformities   Heart  Regular rate and rhythm, edema absent   Abdomen  Soft and non-tender; no masses, bowel sounds present, no distension   Extremities Symmetric, no clubbing, cyanosis. Skin No rash, no bruises   Neurologic Alert, oriented, moves all extremities, normal speech, ambulatory   Psychatric: Cooperative, verbalized understanding of our discussion, thought processes are clear, questions asked are appropriate. Musculoskeletal No joint deformities, no musclewasting. Line sites No bleeding, no tenderness.              Treating physicians: Treatment Team: Attending Provider: Brennan Barker MD; Consulting Provider: Yamila Villanueva MD; Consulting Provider: Yvonne Shaffer MD; PCP - Health System Attribution Model: Brennan Barker MD    Total time >50 % spent on floor with counseling and coordination of care:

## 2017-02-13 NOTE — DISCHARGE INSTRUCTIONS
General Discharge Instructions    Patient ID:  Mita Diaz  [id number]  68 y.o.  1940    Patient Instructions        The following personal items were collected during your admission and were returned to you. Take Home Medications           What to do at Home    Recommended diet: Regular Diet    Recommended activity: Activity as tolerated    Follow-up with Camelia Okeefe MD  in 5 days. Information obtained by :  I understand that if any problems occur once I am at home I am to contact my physician. I understand and acknowledge receipt of the instructions indicated above.                                                                                                                                            Physician's or R.N.'s Signature                                                                  Date/Time                                                                                                                                              Patient or Representative Signature                                                          Date/Time

## 2017-02-13 NOTE — PROGRESS NOTES
SW met with pt to provide Code 40 and Obs letter. Copy given to pt and copy placed on bedside chart. Pt requesting to be discharged at this time as he stated he had some important business to handle outside the hospital.  He stated he has contacted the attending requesting to be discharge.   SW to attempt to contact the attending and notify the floor nurse of pt request.    ETIENNE Banks  Ext [phone]

## 2017-02-13 NOTE — PROGRESS NOTES
General Daily Progress Note    Admit Date: 2/10/2017    Subjective:     Patient has no complaint . Current Facility-Administered Medications   Medication Dose Route Frequency    predniSONE (DELTASONE) tablet 60 mg  60 mg Oral DAILY WITH BREAKFAST    valsartan (DIOVAN) tablet 160 mg  160 mg Oral DAILY    influenza vaccine 2016-17 (36mos+)(PF) (FLUZONE/FLUARIX/FLULAVAL QUAD) injection 0.5 mL  0.5 mL IntraMUSCular PRIOR TO DISCHARGE    sodium chloride (NS) flush 5-10 mL  5-10 mL IntraVENous Q8H    sodium chloride (NS) flush 5-10 mL  5-10 mL IntraVENous PRN    acetaminophen (TYLENOL) tablet 650 mg  650 mg Oral Q4H PRN    ondansetron (ZOFRAN) injection 4 mg  4 mg IntraVENous Q4H PRN    bisacodyl (DULCOLAX) tablet 5 mg  5 mg Oral DAILY PRN    atorvastatin (LIPITOR) tablet 20 mg  20 mg Oral QHS    amLODIPine (NORVASC) tablet 5 mg  5 mg Oral DAILY    finasteride (PROSCAR) tablet 5 mg  5 mg Oral DAILY        Review of Systems  A comprehensive review of systems was negative.     Objective:     Patient Vitals for the past 24 hrs:   BP Temp Pulse Resp SpO2   02/13/17 0811 120/69 97.9 °F (36.6 °C) (!) 58 16 96 %   02/13/17 0316 101/64 98.1 °F (36.7 °C) 73 18 97 %   02/12/17 2216 126/79 98.5 °F (36.9 °C) 87 16 96 %   02/12/17 1930 125/75 98.2 °F (36.8 °C) 88 18 95 %   02/12/17 1503 119/76 98.7 °F (37.1 °C) 71 16 97 %   02/12/17 1134 120/82 98.3 °F (36.8 °C) 68 16 98 %        02/11 1901 - 02/13 0700  In: 600 [P.O.:600]  Out: -     Physical Exam:   Visit Vitals    /69 (BP 1 Location: Left arm, BP Patient Position: At rest)    Pulse (!) 58    Temp 97.9 °F (36.6 °C)    Resp 16    Ht 5' 11.5\" (1.816 m)    Wt 155 lb 10.3 oz (70.6 kg)    SpO2 96%    BMI 21.41 kg/m2     General appearance: alert, cooperative, no distress, appears stated age  Neck: supple, symmetrical, trachea midline, no adenopathy, thyroid: not enlarged, symmetric, no tenderness/mass/nodules, no carotid bruit and no JVD  Lungs: clear to auscultation bilaterally  Heart: regular rate and rhythm, S1, S2 normal, no murmur, click, rub or gallop        Data Review   Recent Results (from the past 24 hour(s))   VITAMIN B12    Collection Time: 02/12/17 10:42 AM   Result Value Ref Range    Vitamin B12 1244 (H) 211 - 911 pg/mL   FERRITIN    Collection Time: 02/12/17 10:42 AM   Result Value Ref Range    Ferritin 53 26 - 388 NG/ML   FOLATE    Collection Time: 02/12/17 10:42 AM   Result Value Ref Range    Folate 22.8 (H) 5.0 - 21.0 ng/mL   IRON PROFILE    Collection Time: 02/12/17  6:44 PM   Result Value Ref Range    Iron 73 35 - 150 ug/dL    TIBC 311 250 - 450 ug/dL    Iron % saturation 23 20 - 50 %   RETICULOCYTE COUNT    Collection Time: 02/12/17  6:44 PM   Result Value Ref Range    Reticulocyte count 0.5 (L) 0.7 - 2.1 %           Assessment:     Active Problems: Thrombocytopenia ([address] (2/10/2017)        Plan: 1. No idea of response from steroids. Await dispo from hematology. Not sure but  pt should not be D/Eligio with current degree of thrombocytopenia. Levi Calderon

## 2017-02-13 NOTE — PROGRESS NOTES
Letter of Status Determination: Upgrade from Observation to Inpatient  Status           Pt Name:  Aniceto Cardenas   MR#  [id number]   [facility]#   [id number]   [address]  3107/01  @ [facility]   Admit date  2/10/2017  7:53 PM   Current Attending Physician  Franny Darden MD   Insurance  Payor: Vira Rich / Plan: [place] MEDICARE PART A & B / Product Type: Medicare /    Principal diagnosis  <principal problem not specified>   \"Thrombocytopenia ([address] Thrombocytopenia (HCC\"   Clinicals  68 y.o. y.o  male hospitalized with above diagnosis  This pt is being treated with steroids. Her platelet count lowest this admission was 10k. Oncology is following her case. [facility] criteria   Does NOT apply     STATUS DETERMINATION  This patient is at above maria m risk of deterioration based on documented presenting clinical data, comorbid conditions, high risk of adverse events and current acute care course. Mr. Aniceto Cardenas. now meets Inpatient Admission status criteria in accordance with CMS regulation Section 43 .3. Specifically, due to medical necessity the patient's stay now exceeds Two Midnights. It is our judgment that this patient's hospitalization status should be upgraded from  OBSERVATION to INPATIENT status.        Additional comments         Basil Haddad MD MPH FACP     Physician Advisor    [address]     [facility], [address] [place] [address]    Cell  [phone]

## 2017-02-14 ENCOUNTER — PATIENT OUTREACH (OUTPATIENT)
Dept: INTERNAL MEDICINE CLINIC | Age: 77
End: 2017-02-14

## 2017-02-14 NOTE — DISCHARGE SUMMARY
[address] SUMMARY       Name:  Lena Perry   MR#:  [id number]   :  1940   Account #:  [de-identified]        Date of Adm:  02/10/2017       HISTORY OF PRESENT ILLNESS: The patient is a 72-year-old   gentleman who had a routine physical in my office 1 day prior to the   returning of his lab work. He had a platelet count of 12. His last series   of platelet counts were in the 200,000 range. This was repeated and   was 18 and in view of this, it was elected to admit him. It was felt that   admission was indicated. Fortunately, he has not had any bleeding   episodes. He has felt well otherwise. PAST MEDICAL HISTORY, SOCIAL HISTORY, REVIEW OF   SYSTEMS, FAMILY HISTORY, PHYSICAL EXAMINATION: As in   admitting H and P.     LABORATORY VALUES: His initial white count was 2.8 with a   hemoglobin of 12.3, platelet count of 19 with the following differential,   49 segs, 34 lymphocytes, 12 monocytes, 3 eosinophils, and 2   basophils. Repeat white count the following day was 2.3. On   2017, post 2 days of steroids, platelet count was 31. Retic count   was 0.5 with a sed rate of 7. Urinalysis revealed 2+ proteinuria, 2-10   RBCs per high-powered field with a normal CMP, underwent a slight   elevation of creatinine 1.42. B12 level was 637, folate was 22, iron was   73, total iron binding capacity was 311 with a ferritin level of 53. His PC   ratio was 0.9. YOSVANY was negative. An HIV screening pending. CONSULTATION: Hematology consultation was obtained with Dr. Eljiah Burroughs. Comments will be elaborated on in the hospital course. HOSPITAL COURSE: The patient was admitted and fortunately no   bleeding occurred.  He was seen by Hematology and there were many   potential possibilities that could indeed exist. The appropriate lab work   was drawn at the time and an empiric trial of prednisone was deemed   to be most appropriate. Ultrasound was ordered, but was not done. The patient felt well the entire time and fortunately, after 2 days of   prednisone, his platelet count increased from 18 to 31. He was   therefore discharged to be followed up as an outpatient. FINAL DIAGNOSES:   1. Thrombocytopenia, profound of undetermined etiology. 2. Normocytic normochromic anemia. 3. Primary hypertension. 4. Prostatism. 5. Dyslipidemia. DISPOSITION: The patient discharged home ambulatory on a regular   diet. DISCHARGE MEDICATIONS   Include the followin. Prednisone 60 mg daily p.c.     2. Finasteride 5 mg daily. 3. Losartan 100 mg daily. 4. Simvastatin 40 mg daily. 5. Amlodipine 10 mg daily. 6. Sildenafil 100 mg p.r.n. He will follow up with the hematologist.         Kely Sarmiento.  Inder Cortez MD      LAB / [facility]   D:  2017   18:46   T:  2017   20:07   Job #:  [id number]

## 2017-02-14 NOTE — PROGRESS NOTES
Outreach attempted, patient unavailable. Spoke to patients wife Peg Dancer. Advised of the need to follow up with Dr Jennie Collado for [facility] appointment. Patients wife Peg Dancer states\" she was just about to call to schedule an appointment for her \". This NN scheduled appointment for tomorrow with Dr Jennie Collado at [facility] office at 1:30 PM.  Advised patients wife Peg Dancer to have patient return call to speak with NN in regards to recent  Hospital stay. Peg Dancer stated she will have patient contact office when he returns home.

## 2017-02-15 ENCOUNTER — OFFICE VISIT (OUTPATIENT)
Dept: INTERNAL MEDICINE CLINIC | Age: 77
End: 2017-02-15

## 2017-02-15 VITALS
SYSTOLIC BLOOD PRESSURE: 139 MMHG | RESPIRATION RATE: 12 BRPM | HEART RATE: 64 BPM | TEMPERATURE: 97.3 F | BODY MASS INDEX: 21.56 KG/M2 | OXYGEN SATURATION: 100 % | DIASTOLIC BLOOD PRESSURE: 79 MMHG | WEIGHT: 154 LBS | HEIGHT: 71 IN

## 2017-02-15 DIAGNOSIS — D69.6 THROMBOCYTOPENIA (HCC): Primary | ICD-10-CM

## 2017-02-15 DIAGNOSIS — I10 ESSENTIAL HYPERTENSION: ICD-10-CM

## 2017-02-15 LAB
HIV 1+2 AB+HIV1 P24 AG SERPL QL IA: NONREACTIVE
HIV12 RESULT COMMENT, HHIVC: NORMAL

## 2017-02-15 RX ORDER — AMLODIPINE BESYLATE 10 MG/1
TABLET ORAL
Refills: 11 | COMMUNITY
Start: 2017-01-02 | End: 2017-09-26 | Stop reason: CLARIF

## 2017-02-15 RX ORDER — FINASTERIDE 5 MG/1
TABLET, FILM COATED ORAL
Refills: 11 | COMMUNITY
Start: 2017-01-02 | End: 2017-09-26 | Stop reason: CLARIF

## 2017-02-15 NOTE — PROGRESS NOTES
.1. Have you been to the ER, urgent care clinic since your last visit? Hospitalized since your last visit? Yes Where: [facility]    2. Have you seen or consulted any other health care providers outside of the [address] [place] [address] since your last visit? Include any pap smears or colon screening.  Yes Reason for visit: Low Platelet count

## 2017-02-15 NOTE — MR AVS SNAPSHOT
Visit Information Date & Time Provider Department Dept. Phone Encounter #  
 2/15/2017  1:30 PM Sherlyn Leigh MD [facility] - [facility] [phone] [id number] Your Appointments 3/15/2017  1:30 PM  
Any with Sherlyn Leigh MD  
[facility] - [facility] ([address]) Appt Note: 1 month f/u  
 [address], [place] [address] [facility] 98  
  
   
 [address] [address] Upcoming Health Maintenance Date Due  
 GLAUCOMA SCREENING Q2Y 10/1/2017 Pneumococcal 65+ Low/Medium Risk (2 of 2 - PPSV23) 2/9/2018 MEDICARE YEARLY EXAM 2/10/2018 DTaP/Tdap/Td series (2 - Td) 2/9/2027 Allergies as of 2/15/2017  Review Complete On: 2/15/2017 By: Edwina Britton Severity Noted Reaction Type Reactions Lisinopril  10/14/2014    Swelling Current Immunizations  Never Reviewed No immunizations on file. Not reviewed this visit Vitals BP Pulse Temp Resp Height(growth percentile) Weight(growth percentile) 139/79 (BP 1 Location: Left arm, BP Patient Position: Sitting) 64 97.3 °F (36.3 °C) (Oral) 12 5' 11\" (1.803 m) 154 lb (69.9 kg) SpO2 BMI Smoking Status 100% 21.48 kg/m2 Never Smoker BMI and BSA Data Body Mass Index Body Surface Area  
 21.48 kg/m 2 1.87 m 2 Preferred Pharmacy Pharmacy Name Phone [id number] [place] [facility] - [address] [phone] Your Updated Medication List  
  
   
This list is accurate as of: 2/15/17  2:54 PM.  Always use your most recent med list. amLODIPine 10 mg tablet Commonly known as:  Lennis Eagles TAKE 1 TABLET BY MOUTH EVERY DAY  
  
 finasteride 5 mg tablet Commonly known as:  PROSCAR  
TAKE 1 TABLET BY MOUTH EVERY DAY  
  
 losartan 100 mg tablet Commonly known as:  COZAAR Take 1 Tab by mouth daily. predniSONE 20 mg tablet Commonly known as:  Berle Pouch Take 3 Tabs by mouth daily. sildenafil citrate 100 mg tablet Commonly known as:  VIAGRA TAKE 1 TABLET BY MOUTH AS NEEDED  
  
 simvastatin 40 mg tablet Commonly known as:  ZOCOR Take 1 Tab by mouth nightly. Introducing [facility]! Ab Tavares introduces [employer] patient portal. Now you can access parts of your medical record, email your doctor's office, and request medication refills online. 1. In your internet browser, go to [email or web address] 2. Click on the First Time User? Click Here link in the Sign In box. You will see the New Member Sign Up page. 3. Enter your [employer] Access Code exactly as it appears below. You will not need to use this code after youve completed the sign-up process. If you do not sign up before the expiration date, you must request a new code. · [employer] Access Code: [email or web address] Expires: 5/10/2017 12:51 PM 
 
4. Enter the last four digits of your Social Security Number (xxxx) and Date of Birth (mm/dd/yyyy) as indicated and click Submit. You will be taken to the next sign-up page. 5. Create a [employer] ID. This will be your [employer] login ID and cannot be changed, so think of one that is secure and easy to remember. 6. Create a [employer] password. You can change your password at any time. 7. Enter your Password Reset Question and Answer. This can be used at a later time if you forget your password. 8. Enter your e-mail address. You will receive e-mail notification when new information is available in [address] 9. Click Sign Up. You can now view and download portions of your medical record. 10. Click the Download Summary menu link to download a portable copy of your medical information. If you have questions, please visit the Frequently Asked Questions section of the [employer] website.  Remember, [employer] is NOT to be used for urgent needs. For medical emergencies, dial 911. Now available from your iPhone and Android! Please provide this summary of care documentation to your next provider. Your primary care clinician is listed as Marly Varela. If you have any questions after today's visit, please call [phone].

## 2017-02-19 NOTE — PROGRESS NOTES
Chief Complaint   Patient presents with   [facility] Follow Up   . SUBECTIVE:    Linda Smith is a 68 y.o. male comes in for return visit accompanied by his wife. He had rather impressive idiopathic thrombocytopenia down to 13. When he left the hospital after being on two days of Prednisone 60 mg he was up to 30 and he was subsequently discharged. There were no diagnostic studies that shed any light on his current problem. 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 He will follow-up with the hematologist next week. He is having no adverse effects from the systemic steroids. He remains on all of his other medications as prescribed. Current Outpatient Prescriptions   Medication Sig Dispense Refill    amLODIPine (NORVASC) 10 mg tablet TAKE 1 TABLET BY MOUTH EVERY DAY  11    finasteride (PROSCAR) 5 mg tablet TAKE 1 TABLET BY MOUTH EVERY DAY  11    predniSONE (DELTASONE) 20 mg tablet Take 3 Tabs by mouth daily. 60 Tab 0    simvastatin (ZOCOR) 40 mg tablet Take 1 Tab by mouth nightly. 30 Tab 11    losartan (COZAAR) 100 mg tablet Take 1 Tab by mouth daily. 30 Tab 11    sildenafil citrate (VIAGRA) 100 mg tablet TAKE 1 TABLET BY MOUTH AS NEEDED 7 Tab 11     Past Medical History   Diagnosis Date    Hypertension      History reviewed. No pertinent past surgical history.   Allergies   Allergen Reactions    Lisinopril Swelling       REVIEW OF SYSTEMS:  Review of Systems - Negative except   ENT ROS: negative for - headaches, hearing change, nasal congestion, oral lesions, tinnitus, visual changes or   Respiratory ROS: no cough, shortness of breath, or wheezing  Cardiovascular ROS: no chest pain or dyspnea on exertion  Gastrointestinal ROS: no abdominal pain, change in bowel habits, or black or blood  Genito-Urinary ROS: no dysuria, trouble voiding, or hematuria  Musculoskeletal ROS: negative  Neurological ROS: no TIA or stroke symptoms      Social History     Social History    Marital status:      Spouse name: N/A    Number of children: N/A    Years of education: N/A     Social History Main Topics    Smoking status: Never Smoker    Smokeless tobacco: Never Used    Alcohol use Yes    Drug use: No    Sexual activity: Yes     Partners: Female     Other Topics Concern    None     Social History Narrative   r  History reviewed. No pertinent family history. OBJECTIVE:  Visit Vitals    /79 (BP 1 Location: Left arm, BP Patient Position: Sitting)    Pulse 64    Temp 97.3 °F (36.3 °C) (Oral)    Resp 12    Ht 5' 11\" (1.803 m)    Wt 154 lb (69.9 kg)    SpO2 100%    BMI 21.48 kg/m2     ENT: perrla,  eom intact  NECK: supple. Thyroid normal, no JVD  CHEST: clear to ascultation and percussion   HEART: regular rate and rhythm  ABD: soft, bowel sounds active,   EXTREMITIES: no edema, pulse 1+  INTEGUMENT: clear      ASSESSMENT:  1. Thrombocytopenia ([address]    2. Essential hypertension        PLAN:    1. He appears to be doing fairly well. He obviously has not had any adverse effect from the systemic steroids. He will follow-up with the hematologist.   2. He will continue his antihypertensive medication as prescribed as is the case with all of his other medications. .  Orders Placed This Encounter    amLODIPine (NORVASC) 10 mg tablet    finasteride (PROSCAR) 5 mg tablet       Follow-up Disposition:  Return in about 4 weeks (around 3/15/2017).       Perry Amos MD

## 2017-03-10 RX ORDER — PREDNISONE 20 MG/1
60 TABLET ORAL DAILY
Qty: 60 TAB | Refills: 0 | OUTPATIENT
Start: 2017-03-10

## 2017-06-06 NOTE — ROUTINE PROCESS
* No surgery found *  Bedside and Verbal shift change report given to Kathryn (oncoming nurse) by Abdelrahman Lopez (offgoing nurse). Report included the following information SBAR, Kardex, Procedure Summary, Intake/Output, MAR and Recent Results. Zone Phone:   [phone]      Significant changes during shift:  None        Patient Information    David Dean.  68 y.o.  2/10/2017  7:53 PM by Elenita Rodriguez MD. Sumi Bruno Sr. was admitted from Home    Problem List    Patient Active Problem List    Diagnosis Date Noted    Thrombocytopenia ([facility] [address] 02/10/2017    Proteinuria 02/09/2017    Dermatitis 10/01/2015    Hypertension 10/14/2014    Prostatism 10/14/2014    Dyslipidemia 10/14/2014     Past Medical History   Diagnosis Date    Hypertension          Post Op Surgical (If Applicable):     NA    Activity Status:    OOB to Chair No  Ambulated this shift Yes   Bed Rest No    Supplemental O2: (If Applicable)    NA      LINES AND DRAINS:    20 R AC IVL    DVT prophylaxis:    DVT prophylaxis Med- No  DVT prophylaxis SCD or MARK- No     Wounds: (If Applicable)    NONE    Patient Safety:    Falls Score Total Score: 1  Safety Level_______  Bed Alarm On? No  Sitter?  No    Plan for upcoming shift: Oncology consult to evaluate        Discharge Plan: Yes when medically cleared     Active Consults:  IP CONSULT TO HEMATOLOGY continue meds

## 2017-08-10 ENCOUNTER — OFFICE VISIT (OUTPATIENT)
Dept: INTERNAL MEDICINE CLINIC | Age: 77
End: 2017-08-10

## 2017-08-10 VITALS
RESPIRATION RATE: 16 BRPM | WEIGHT: 155.7 LBS | HEIGHT: 71 IN | DIASTOLIC BLOOD PRESSURE: 78 MMHG | TEMPERATURE: 98.1 F | SYSTOLIC BLOOD PRESSURE: 129 MMHG | OXYGEN SATURATION: 98 % | BODY MASS INDEX: 21.8 KG/M2 | HEART RATE: 60 BPM

## 2017-08-10 DIAGNOSIS — I10 ESSENTIAL HYPERTENSION: ICD-10-CM

## 2017-08-10 DIAGNOSIS — D69.6 THROMBOCYTOPENIA (HCC): Primary | ICD-10-CM

## 2017-08-10 DIAGNOSIS — E78.5 DYSLIPIDEMIA: ICD-10-CM

## 2017-08-10 DIAGNOSIS — N52.9 ERECTILE DYSFUNCTION, UNSPECIFIED ERECTILE DYSFUNCTION TYPE: ICD-10-CM

## 2017-08-10 DIAGNOSIS — R80.9 PROTEINURIA, UNSPECIFIED TYPE: ICD-10-CM

## 2017-08-10 RX ORDER — SILDENAFIL CITRATE 20 MG/1
TABLET ORAL
Qty: 30 TAB | Refills: 11 | Status: SHIPPED | OUTPATIENT
Start: 2017-08-10 | End: 2018-02-08 | Stop reason: SDUPTHER

## 2017-08-10 NOTE — MR AVS SNAPSHOT
Visit Information Date & Time Provider Department Dept. Phone Encounter #  
 8/10/2017  9:15 AM [facility] [phone] [id number] Follow-up Instructions Return in about 6 months (around 2/10/2018). Upcoming Health Maintenance Date Due  
 GLAUCOMA SCREENING Q2Y 10/1/2017 Pneumococcal 65+ Low/Medium Risk (2 of 2 - PPSV23) 2/9/2018 MEDICARE YEARLY EXAM 2/10/2018 DTaP/Tdap/Td series (2 - Td) 2/9/2027 Allergies as of 8/10/2017  Review Complete On: 8/10/2017 By: Daniel Le Severity Noted Reaction Type Reactions Lisinopril  10/14/2014    Swelling Current Immunizations  Never Reviewed No immunizations on file. Not reviewed this visit You Were Diagnosed With   
  
 Codes Comments Thrombocytopenia ([facility] [address]    -  Primary ICD-10-CM: D69.6 ICD-9-CM: 287.5 Essential hypertension     ICD-10-CM: I10 
ICD-9-CM: 401.9 Dyslipidemia     ICD-10-CM: E78.5 ICD-9-CM: 272.4 Erectile dysfunction, unspecified erectile dysfunction type     ICD-10-CM: N52.9 ICD-9-CM: 607.84 Proteinuria, unspecified type     ICD-10-CM: R80.9 ICD-9-CM: 791.0 Vitals BP Pulse Temp Resp Height(growth percentile) Weight(growth percentile) 129/78 (BP 1 Location: Right arm, BP Patient Position: Sitting) 60 98.1 °F (36.7 °C) (Oral) 16 5' 11\" (1.803 m) 155 lb 11.2 oz (70.6 kg) SpO2 BMI Smoking Status 98% 21.72 kg/m2 Never Smoker BMI and BSA Data Body Mass Index Body Surface Area 21.72 kg/m 2 1.88 m 2 Preferred Pharmacy Pharmacy Name Phone [facility]ACY #[id number]- [address] [phone] Your Updated Medication List  
  
   
This list is accurate as of: 8/10/17  9:57 AM.  Always use your most recent med list. amLODIPine 10 mg tablet Commonly known as:  Junaid Rings TAKE 1 TABLET BY MOUTH EVERY DAY  
  
 finasteride 5 mg tablet Commonly known as:  PROSCAR  
TAKE 1 TABLET BY MOUTH EVERY DAY  
  
 losartan 100 mg tablet Commonly known as:  COZAAR Take 1 Tab by mouth daily. predniSONE 20 mg tablet Commonly known as:  Radha Loth Take 3 Tabs by mouth daily. * sildenafil citrate 100 mg tablet Commonly known as:  VIAGRA TAKE 1 TABLET BY MOUTH AS NEEDED * sildenafil 20 mg tablet Commonly known as:  REVATIO Daily as needed  
  
 simvastatin 40 mg tablet Commonly known as:  ZOCOR Take 1 Tab by mouth nightly. * Notice: This list has 2 medication(s) that are the same as other medications prescribed for you. Read the directions carefully, and ask your doctor or other care provider to review them with you. Prescriptions Printed Refills  
 sildenafil (REVATIO) 20 mg tablet 11 Sig: Daily as needed Class: Print We Performed the Following CREATININE, UR, RANDOM [id number] CPT(R)] Kirsten Metzger [[id number] Custom] Follow-up Instructions Return in about 6 months (around 2/10/2018). Introducing [facility]! [facility] introduces Two[employer] patient portal. Now you can access parts of your medical record, email your doctor's office, and request medication refills online. 1. In your internet browser, go to [email or web address] 2. Click on the First Time User? Click Here link in the Sign In box. You will see the New Member Sign Up page. 3. Enter your TwoTen Access Code exactly as it appears below. You will not need to use this code after youve completed the sign-up process. If you do not sign up before the expiration date, you must request a new code. · TwoTen Access Code: [email or web address] Expires: 11/8/2017  9:57 AM 
 
4. Enter the last four digits of your Social Security Number (xxxx) and Date of Birth (mm/dd/yyyy) as indicated and click Submit.  You will be taken to the next sign-up page. 5. Create a Chiral Quest ID. This will be your Chiral Quest login ID and cannot be changed, so think of one that is secure and easy to remember. 6. Create a Ch[employer] Quest password. You can change your password at any time. 7. Enter your Password Reset Question and Answer. This can be used at a later time if you forget your password. 8. Enter your e-mail address. You will receive e-mail notification when new information is available in [address] 9. Click Sign Up. You can now view and download portions of your medical record. 10. Click the Download Summary menu link to download a portable copy of your medical information. If you have questions, please visit the Frequently Asked Questions section of the Ch[employer] website. Remember, [employer] is NOT to be used for urgent needs. For medical emergencies, dial 911. Now available from your iPhone and Android! Please provide this summary of care documentation to your next provider. Your primary care clinician is listed as Marly Varela. If you have any questions after today's visit, please call [phone].

## 2017-08-10 NOTE — PROGRESS NOTES
Chief Complaint   Patient presents with    Thrombocytopenia     routine follow up      1. Have you been to the ER, urgent care clinic since your last visit? Hospitalized since your last visit? No    2. Have you seen or consulted any other health care providers outside of the [address] [place] [address] since your last visit? Include any pap smears or colon screening.  No

## 2017-08-10 NOTE — PROGRESS NOTES
[address] [facility]  [place] [address]  [address] [place] [address]  Phone:  [phone]  Fax: [phone]       Chief Complaint   Patient presents with    Thrombocytopenia     routine follow up    . SUBJECTIVE:    Mona Avalos is a 68 y.o. male Comes in for return visit stating that he has done fairly well. He is actively followed by hematology for his thrombocytopenia. He is currently on Prednisone 5 mg q.d. He continues to have erectile dysfunction, takes Sildenafil. He has a past history of primary hypertension, as well as dyslipidemia. He remains reasonably physically active. Unfortunately, his 27year old daughter  earlier in the year. Current Outpatient Prescriptions   Medication Sig Dispense Refill    sildenafil (REVATIO) 20 mg tablet Daily as needed 30 Tab 11    amLODIPine (NORVASC) 10 mg tablet TAKE 1 TABLET BY MOUTH EVERY DAY  11    finasteride (PROSCAR) 5 mg tablet TAKE 1 TABLET BY MOUTH EVERY DAY  11    predniSONE (DELTASONE) 20 mg tablet Take 3 Tabs by mouth daily. 60 Tab 0    simvastatin (ZOCOR) 40 mg tablet Take 1 Tab by mouth nightly. 30 Tab 11    losartan (COZAAR) 100 mg tablet Take 1 Tab by mouth daily. 30 Tab 11    sildenafil citrate (VIAGRA) 100 mg tablet TAKE 1 TABLET BY MOUTH AS NEEDED 7 Tab 11     Past Medical History:   Diagnosis Date    Hypertension      History reviewed. No pertinent surgical history.   Allergies   Allergen Reactions    Lisinopril Swelling         REVIEW OF SYSTEMS:  General: negative for - chills or fever  ENT: negative for - headaches, nasal congestion or tinnitus  Respiratory: negative for - cough, hemoptysis, shortness of breath or wheezing  Cardiovascular : negative for - chest pain, edema, palpitations or shortness of breath  Gastrointestinal: negative for - abdominal pain, blood in stools, heartburn or nausea/vomiting  Genito-Urinary: no dysuria, trouble voiding, or hematuria  Musculoskeletal: negative for - gait disturbance, joint pain, joint stiffness or joint swelling  Neurological: no TIA or stroke symptoms  Hematologic: no bruises, no bleeding, no swollen glands  Integument: no lumps, mole changes, nail changes or rash  Endocrine: no malaise/lethargy or unexpected weight changes      Social History     Social History    Marital status:      Spouse name: N/A    Number of children: N/A    Years of education: N/A     Social History Main Topics    Smoking status: Never Smoker    Smokeless tobacco: Never Used    Alcohol use Yes    Drug use: No    Sexual activity: Yes     Partners: Female     Other Topics Concern    None     Social History Narrative     History reviewed. No pertinent family history. OBJECTIVE:    Visit Vitals    /78 (BP 1 Location: Right arm, BP Patient Position: Sitting)    Pulse 60    Temp 98.1 °F (36.7 °C) (Oral)    Resp 16    Ht 5' 11\" (1.803 m)    Wt 155 lb 11.2 oz (70.6 kg)    SpO2 98%    BMI 21.72 kg/m2     CONSTITUTIONAL: well , well nourished, appears age appropriate  EYES: perrla, eom intact  ENMT:moist mucous membranes, pharynx clear  NECK: supple. Thyroid normal  RESPIRATORY: Chest: clear to ascultation and percussion   CARDIOVASCULAR: Heart: regular rate and rhythm  GASTROINTESTINAL: Abdomen: soft, bowel sounds active  HEMATOLOGIC: no pathological lymph nodes palpated  MUSCULOSKELETAL: Extremities: no edema, pulse 1+   INTEGUMENT: No unusual rashes or suspicious skin lesions noted. Nails appear normal.  NEUROLOGIC: non-focal exam   MENTAL STATUS: alert and oriented, appropriate affect      ASSESSMENT:  1. Thrombocytopenia ([address]    2. Essential hypertension    3. Dyslipidemia    4. Erectile dysfunction, unspecified erectile dysfunction type    5. Proteinuria, unspecified type        PLAN:    1. 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 Lise Paiz of hematology.   He is in the process of attempting to get weaned off of the Prednisone. He is not really interested in having a splenectomy. Hopefully it will not come to this. 2. Blood pressure is excellent today. No adjustments are made. 3. He will continue statin as prescribed. His last Apo B level was superb. 4. As far as his erectile dysfunction, I will give him generic Sildenafil 20 mg tablets. He will go to [facility] to get this prescription filled. 5. He does have mild proteinuria, and this will be quantitated to ensure lack of progression. I am not entirely sure of the etiology of this, however. .  Orders Placed This Encounter    CREATININE, UR, RANDOM    PROTEIN,TOTAL,URINE    sildenafil (REVATIO) 20 mg tablet         Follow-up Disposition:  Return in about 6 months (around 2/10/2018).       Sheldon Jesus MD

## 2017-08-11 LAB
CREAT UR-MCNC: 110 MG/DL
PROT UR-MCNC: 52 MG/DL

## 2017-09-26 DIAGNOSIS — I10 ESSENTIAL HYPERTENSION: ICD-10-CM

## 2017-09-26 DIAGNOSIS — N40.0 PROSTATISM: ICD-10-CM

## 2017-09-26 RX ORDER — AMLODIPINE BESYLATE 10 MG/1
TABLET ORAL
Qty: 30 TAB | Refills: 3 | Status: SHIPPED | OUTPATIENT
Start: 2017-09-26 | End: 2018-02-08 | Stop reason: SDUPTHER

## 2017-09-26 RX ORDER — FINASTERIDE 5 MG/1
TABLET, FILM COATED ORAL
Qty: 30 TAB | Refills: 3 | Status: SHIPPED | OUTPATIENT
Start: 2017-09-26 | End: 2018-02-08 | Stop reason: SDUPTHER

## 2018-02-08 ENCOUNTER — OFFICE VISIT (OUTPATIENT)
Dept: INTERNAL MEDICINE CLINIC | Age: 78
End: 2018-02-08

## 2018-02-08 VITALS
OXYGEN SATURATION: 97 % | WEIGHT: 151.7 LBS | HEIGHT: 71 IN | RESPIRATION RATE: 16 BRPM | HEART RATE: 56 BPM | DIASTOLIC BLOOD PRESSURE: 76 MMHG | TEMPERATURE: 97.6 F | SYSTOLIC BLOOD PRESSURE: 136 MMHG | BODY MASS INDEX: 21.24 KG/M2

## 2018-02-08 DIAGNOSIS — E78.5 DYSLIPIDEMIA: ICD-10-CM

## 2018-02-08 DIAGNOSIS — R19.5 OCCULT BLOOD IN STOOLS: ICD-10-CM

## 2018-02-08 DIAGNOSIS — I10 ESSENTIAL HYPERTENSION: Primary | ICD-10-CM

## 2018-02-08 DIAGNOSIS — R80.9 PROTEINURIA, UNSPECIFIED TYPE: ICD-10-CM

## 2018-02-08 DIAGNOSIS — N40.0 PROSTATISM: ICD-10-CM

## 2018-02-08 DIAGNOSIS — D69.6 THROMBOCYTOPENIA (HCC): ICD-10-CM

## 2018-02-08 RX ORDER — SILDENAFIL CITRATE 20 MG/1
TABLET ORAL
Qty: 30 TAB | Refills: 11 | Status: SHIPPED | OUTPATIENT
Start: 2018-02-08 | End: 2018-10-30 | Stop reason: SDUPTHER

## 2018-02-08 NOTE — PROGRESS NOTES
[address] [facility]  [place] [address]  [address] [facility] [address]  Phone:  [phone]  Fax: [phone]       Chief Complaint   Patient presents with    Hypertension     6 month follow up    . SUBJECTIVE:    Harley Jamil is a 68 y.o. male comes in for a yearly physical.  He states that he is basically doing well. He continues to have a sedentary existence. Unfortunately, he had a tragedy in his life in that his 27year old daughter , which is obviously unexpected. He is following with hematology because of his thrombocytopenia. It is unclear as to the etiology but there was a rapid improvement that occurred. He has a past history of primary hypertension, dyslipidemia and mild proteinuria. Fortunately, the latter has not been progressive. Finally, he has erectile dysfunction and uses sildenafil. Current Outpatient Prescriptions   Medication Sig Dispense Refill    finasteride (PROSCAR) 5 mg tablet TAKE 1 TABLET BY MOUTH EVERY DAY 30 Tab 11    amLODIPine (NORVASC) 10 mg tablet TAKE 1 TABLET BY MOUTH EVERY DAY 30 Tab 3    simvastatin (ZOCOR) 40 mg tablet Take 1 Tab by mouth nightly. 30 Tab 11    losartan (COZAAR) 100 mg tablet Take 1 Tab by mouth daily. 30 Tab 11    sildenafil, antihypertensive, (REVATIO) 20 mg tablet Daily as needed 30 Tab 11     Past Medical History:   Diagnosis Date    Hypertension      History reviewed. No pertinent surgical history.   Allergies   Allergen Reactions    Lisinopril Swelling         REVIEW OF SYSTEMS:  General: negative for - chills or fever  ENT: negative for - headaches, nasal congestion or tinnitus  Respiratory: negative for - cough, hemoptysis, shortness of breath or wheezing  Cardiovascular : negative for - chest pain, edema, palpitations or shortness of breath  Gastrointestinal: negative for - abdominal pain, blood in stools, heartburn or nausea/vomiting  Genito-Urinary: no dysuria, trouble voiding, or hematuria  Musculoskeletal: negative for - gait disturbance, joint pain, joint stiffness or joint swelling  Neurological: no TIA or stroke symptoms  Hematologic: no bruises, no bleeding, no swollen glands  Integument: no lumps, mole changes, nail changes or rash  Endocrine: no malaise/lethargy or unexpected weight changes      Social History     Social History    Marital status:      Spouse name: N/A    Number of children: N/A    Years of education: N/A     Social History Main Topics    Smoking status: Never Smoker    Smokeless tobacco: Never Used    Alcohol use Yes    Drug use: No    Sexual activity: Yes     Partners: Female     Other Topics Concern    None     Social History Narrative     History reviewed. No pertinent family history. OBJECTIVE:    Visit Vitals    /76 (BP 1 Location: Right arm, BP Patient Position: Sitting)    Pulse (!) 56    Temp 97.6 °F (36.4 °C)    Resp 16    Ht 5' 11\" (1.803 m)    Wt 151 lb 11.2 oz (68.8 kg)    SpO2 97%    BMI 21.16 kg/m2     CONSTITUTIONAL: well , well nourished, appears age appropriate  EYES: perrla, eom intact  ENMT:moist mucous membranes, pharynx clear  NECK: supple. Thyroid normal  RESPIRATORY: Chest: clear to ascultation and percussion   CARDIOVASCULAR: Heart: regular rate and rhythm  GASTROINTESTINAL: Abdomen: soft, bowel sounds active  HEMATOLOGIC: no pathological lymph nodes palpated  MUSCULOSKELETAL: Extremities: no edema, pulse 1+   INTEGUMENT: No unusual rashes or suspicious skin lesions noted. Nails appear normal.  NEUROLOGIC: non-focal exam   MENTAL STATUS: alert and oriented, appropriate affect      ASSESSMENT:  1. Essential hypertension    2. Dyslipidemia    3. Proteinuria, unspecified type    4. Prostatism    5. Thrombocytopenia ([address]    6. Occult blood in stools      Diagnoses and all orders for this visit:    1.  Essential hypertension  -     CBC WITH AUTOMATED DIFF  -     METABOLIC PANEL, COMPREHENSIVE  - URINALYSIS W/ RFLX MICROSCOPIC  -     amLODIPine (NORVASC) 10 mg tablet; TAKE 1 TABLET BY MOUTH EVERY DAY    2. Dyslipidemia  -     APOLIPOPROTEIN B  -     LIPID PANEL    3. Proteinuria, unspecified type  -     PROTEIN,TOTAL,URINE  -     CREATININE, UR, RANDOM    4. Prostatism  -     PROSTATE SPECIFIC AG  -     finasteride (PROSCAR) 5 mg tablet; TAKE 1 TABLET BY MOUTH EVERY DAY    5. Thrombocytopenia ([address]  Assessment & Plan: This condition is managed by Specialist.  Lab Results   Component Value Date/Time    WBC 2.9 02/08/2018 10:09 AM    HGB 13.3 02/08/2018 10:09 AM    HCT 40.8 02/08/2018 10:09 AM    PLATELET 850 67/07/1502 10:09 AM    Creatinine 1.39 02/08/2018 10:09 AM    BUN 14 02/08/2018 10:09 AM    Potassium 5.3 02/08/2018 10:09 AM         6. Occult blood in stools  -     OCCULT BLOOD, IMMUNOASSAY (FIT)    Other orders  -     sildenafil, antihypertensive, (REVATIO) 20 mg tablet; Daily as needed  -     simvastatin (ZOCOR) 40 mg tablet; Take 1 Tab by mouth nightly. -     losartan (COZAAR) 100 mg tablet; Take 1 Tab by mouth daily.  -     MICROSCOPIC EXAMINATION        PLAN:    1. Blood pressure is excellent today, no adjustments are made. 2. She will continue statin as prescribed in view of his primary cardiovascular risk prevention status. 3. He has modest proteinuria. I will continue to quantitate this to ensure that there is not progression. 4. He is asymptomatic from a  perspective and will continue his finasteride as prescribed. 5. He will follow-up with hematology for thrombocytopenia. 6. He continues to grieve the most recent death of his daughter. 7. I encouraged him to exercise on a regular basis. He continues with a sedentary existence.       .  Orders Placed This Encounter    APOLIPOPROTEIN B    CBC WITH AUTOMATED DIFF    LIPID PANEL    METABOLIC PANEL, COMPREHENSIVE    PROSTATE SPECIFIC AG    URINALYSIS W/ RFLX MICROSCOPIC    PROTEIN,TOTAL,URINE    CREATININE, UR, RANDOM  OCCULT BLOOD, IMMUNOASSAY (FIT)    MICROSCOPIC EXAMINATION    sildenafil, antihypertensive, (REVATIO) 20 mg tablet    finasteride (PROSCAR) 5 mg tablet    amLODIPine (NORVASC) 10 mg tablet    simvastatin (ZOCOR) 40 mg tablet    losartan (COZAAR) 100 mg tablet         Follow-up Disposition:  Return in about 6 months (around 8/8/2018).       Orestes Miller MD

## 2018-02-08 NOTE — PROGRESS NOTES
Chief Complaint   Patient presents with    Hypertension     6 month follow up      1. Have you been to the ER, urgent care clinic since your last visit? Hospitalized since your last visit? Yes When: about 3 weeks ago [facility] Reason for visit: flu    2. Have you seen or consulted any other health care providers outside of the [address] [place] [address] since your last visit? Include any pap smears or colon screening.  No

## 2018-02-08 NOTE — MR AVS SNAPSHOT
[address] [place] [address] 
 
 
 [place] [id number] 
[phone] Patient: Jina Sever. MRN: [id number] MIW:[birth year] Visit Information Date & Time Provider Department Dept. Phone Encounter #  
 2/8/2018  9:15 AM [facility] [phone] [id number] Your Appointments 8/9/2018  9:00 AM  
Any with Génesis Og MD  
[address] [place] [address] and Primary Care [address] [place] [address]) Appt Note: follow up  
 [facility]  
  
   
 [address] [id number] Upcoming Health Maintenance Date Due  
 GLAUCOMA SCREENING Q2Y 3/8/2018* Pneumococcal 65+ Low/Medium Risk (2 of 2 - PPSV23) 2/9/2018 MEDICARE YEARLY EXAM 2/10/2018 DTaP/Tdap/Td series (2 - Td) 2/9/2027 *Topic was postponed. The date shown is not the original due date. Allergies as of 2/8/2018  Review Complete On: 8/10/2017 By: Génesis Og MD  
  
 Severity Noted Reaction Type Reactions Lisinopril  10/14/2014    Swelling Current Immunizations  Never Reviewed No immunizations on file. Not reviewed this visit You Were Diagnosed With   
  
 Codes Comments Essential hypertension    -  Primary ICD-10-CM: I10 
ICD-9-CM: 401.9 Dyslipidemia     ICD-10-CM: E78.5 ICD-9-CM: 272.4 Proteinuria, unspecified type     ICD-10-CM: R80.9 ICD-9-CM: 791.0 Prostatism     ICD-10-CM: N40.0 ICD-9-CM: 600.90 Thrombocytopenia ([address]     ICD-10-CM: D69.6 ICD-9-CM: 287.5 Occult blood in stools     ICD-10-CM: R19.5 ICD-9-CM: 792.1 Vitals BP Pulse Temp Resp Height(growth percentile) Weight(growth percentile) 136/76 (BP 1 Location: Right arm, BP Patient Position: Sitting) (!) 56 97.6 °F (36.4 °C) 16 5' 11\" (1.803 m) 151 lb 11.2 oz (68.8 kg) SpO2 BMI Smoking Status 97% 21.16 kg/m2 Never Smoker Vitals History BMI and BSA Data Body Mass Index Body Surface Area  
 21.16 kg/m 2 1.86 m 2 Preferred Pharmacy Pharmacy Name Phone [id number]  [phone] Your Updated Medication List  
  
   
This list is accurate as of: 2/8/18 10:26 AM.  Always use your most recent med list. amLODIPine 10 mg tablet Commonly known as:  Tylene Maximino TAKE 1 TABLET BY MOUTH EVERY DAY  
  
 finasteride 5 mg tablet Commonly known as:  PROSCAR  
TAKE 1 TABLET BY MOUTH EVERY DAY  
  
 losartan 100 mg tablet Commonly known as:  COZAAR Take 1 Tab by mouth daily. sildenafil (antihypertensive) 20 mg tablet Commonly known as:  REVATIO Daily as needed  
  
 simvastatin 40 mg tablet Commonly known as:  ZOCOR Take 1 Tab by mouth nightly. Prescriptions Printed Refills  
 sildenafil, antihypertensive, (REVATIO) 20 mg tablet 11 Sig: Daily as needed Class: Print We Performed the Following APOLIPOPROTEIN B [id number] CPT(R)] CBC WITH AUTOMATED DIFF [07014 CPT(R)] CREATININE, UR, RANDOM [id number] CPT(R)] LIPID PANEL [83676 CPT(R)] METABOLIC PANEL, COMPREHENSIVE [44281 CPT(R)] OCCULT BLOOD, IMMUNOASSAY (FIT) N432415 CPT(R)] Cristobal Del Valle [[id number] Custom] PSA, DIAGNOSTIC (PROSTATE SPECIFIC AG) [id number] CPT(R)] URINALYSIS W/ RFLX MICROSCOPIC [36512 CPT(R)] Introducing [facility] & HEALTH SERVICES! Fawad Cohen introduces [employer] patient portal. Now you can access parts of your medical record, email your doctor's office, and request medication refills online. 1. In your internet browser, go to [email or web address] 2. Click on the First Time User? Click Here link in the Sign In box. You will see the New Member Sign Up page. 3. Enter your [employer] Access Code exactly as it appears below.  You will not need to use this code after youve completed the sign-up process. If you do not sign up before the expiration date, you must request a new code. · [employer]hentix Access Code: Rubi Russell Expires: 5/9/2018 10:26 AM 
 
4. Enter the last four digits of your Social Security Number (xxxx) and Date of Birth (mm/dd/yyyy) as indicated and click Submit. You will be taken to the next sign-up page. 5. Create a Authentix ID. This will be your Aut[employer] login ID and cannot be changed, so think of one that is secure and easy to remember. 6. Create a Authentix password. You can change your password at any time. 7. Enter your Password Reset Question and Answer. This can be used at a later time if you forget your password. 8. Enter your e-mail address. You will receive e-mail notification when new information is available in [address] 9. Click Sign Up. You can now view and download portions of your medical record. 10. Click the Download Summary menu link to download a portable copy of your medical information. If you have questions, please visit the Frequently Asked Questions section of the [employer] website. Remember, [employer] is NOT to be used for urgent needs. For medical emergencies, dial 911. Now available from your iPhone and Android! Please provide this summary of care documentation to your next provider. Your primary care clinician is listed as Marly Varela. If you have any questions after today's visit, please call [phone].

## 2018-02-09 LAB
ALBUMIN SERPL-MCNC: 4.4 G/DL (ref 3.5–4.8)
ALBUMIN/GLOB SERPL: 1.6 {RATIO} (ref 1.2–2.2)
ALP SERPL-CCNC: 50 IU/L (ref 39–117)
ALT SERPL-CCNC: 12 IU/L (ref 0–44)
APO B SERPL-MCNC: 67 MG/DL (ref 52–135)
APPEARANCE UR: CLEAR
AST SERPL-CCNC: 17 IU/L (ref 0–40)
BACTERIA #/AREA URNS HPF: NORMAL /[HPF]
BASOPHILS # BLD AUTO: 0 X10E3/UL (ref 0–0.2)
BASOPHILS NFR BLD AUTO: 1 %
BILIRUB SERPL-MCNC: 0.3 MG/DL (ref 0–1.2)
BILIRUB UR QL STRIP: NEGATIVE
BUN SERPL-MCNC: 14 MG/DL (ref 8–27)
BUN/CREAT SERPL: 10 (ref 10–24)
CALCIUM SERPL-MCNC: 9.5 MG/DL (ref 8.6–10.2)
CASTS URNS QL MICRO: NORMAL /LPF
CHLORIDE SERPL-SCNC: 102 MMOL/L (ref 96–106)
CHOLEST SERPL-MCNC: 155 MG/DL (ref 100–199)
CO2 SERPL-SCNC: 27 MMOL/L (ref 18–29)
COLOR UR: YELLOW
CREAT SERPL-MCNC: 1.39 MG/DL (ref 0.76–1.27)
CREAT UR-MCNC: 93.9 MG/DL
EOSINOPHIL # BLD AUTO: 0 X10E3/UL (ref 0–0.4)
EOSINOPHIL NFR BLD AUTO: 1 %
EPI CELLS #/AREA URNS HPF: NORMAL /HPF
ERYTHROCYTE [DISTWIDTH] IN BLOOD BY AUTOMATED COUNT: 14.3 % (ref 12.3–15.4)
GFR SERPLBLD CREATININE-BSD FMLA CKD-EPI: 49 ML/MIN/1.73
GFR SERPLBLD CREATININE-BSD FMLA CKD-EPI: 56 ML/MIN/1.73
GLOBULIN SER CALC-MCNC: 2.7 G/DL (ref 1.5–4.5)
GLUCOSE SERPL-MCNC: 91 MG/DL (ref 65–99)
GLUCOSE UR QL: NEGATIVE
HCT VFR BLD AUTO: 40.8 % (ref 37.5–51)
HDLC SERPL-MCNC: 75 MG/DL
HGB BLD-MCNC: 13.3 G/DL (ref 13–17.7)
HGB UR QL STRIP: NEGATIVE
IMM GRANULOCYTES # BLD: 0 X10E3/UL (ref 0–0.1)
IMM GRANULOCYTES NFR BLD: 0 %
KETONES UR QL STRIP: NEGATIVE
LDLC SERPL CALC-MCNC: 68 MG/DL (ref 0–99)
LEUKOCYTE ESTERASE UR QL STRIP: NEGATIVE
LYMPHOCYTES # BLD AUTO: 1.2 X10E3/UL (ref 0.7–3.1)
LYMPHOCYTES NFR BLD AUTO: 40 %
MCH RBC QN AUTO: 29 PG (ref 26.6–33)
MCHC RBC AUTO-ENTMCNC: 32.6 G/DL (ref 31.5–35.7)
MCV RBC AUTO: 89 FL (ref 79–97)
MICRO URNS: ABNORMAL
MONOCYTES # BLD AUTO: 0.3 X10E3/UL (ref 0.1–0.9)
MONOCYTES NFR BLD AUTO: 10 %
MUCOUS THREADS URNS QL MICRO: PRESENT
NEUTROPHILS # BLD AUTO: 1.4 X10E3/UL (ref 1.4–7)
NEUTROPHILS NFR BLD AUTO: 48 %
NITRITE UR QL STRIP: NEGATIVE
PH UR STRIP: 6 [PH] (ref 5–7.5)
PLATELET # BLD AUTO: 108 X10E3/UL (ref 150–379)
POTASSIUM SERPL-SCNC: 5.3 MMOL/L (ref 3.5–5.2)
PROT SERPL-MCNC: 7.1 G/DL (ref 6–8.5)
PROT UR QL STRIP: ABNORMAL
PROT UR-MCNC: 48.5 MG/DL
PSA SERPL-MCNC: 1.6 NG/ML (ref 0–4)
RBC # BLD AUTO: 4.58 X10E6/UL (ref 4.14–5.8)
RBC #/AREA URNS HPF: NORMAL /HPF
SODIUM SERPL-SCNC: 143 MMOL/L (ref 134–144)
SP GR UR: 1.02 (ref 1–1.03)
TRIGL SERPL-MCNC: 58 MG/DL (ref 0–149)
UROBILINOGEN UR STRIP-MCNC: 0.2 MG/DL (ref 0.2–1)
VLDLC SERPL CALC-MCNC: 12 MG/DL (ref 5–40)
WBC # BLD AUTO: 2.9 X10E3/UL (ref 3.4–10.8)
WBC #/AREA URNS HPF: NORMAL /HPF

## 2018-02-10 RX ORDER — FINASTERIDE 5 MG/1
TABLET, FILM COATED ORAL
Qty: 30 TAB | Refills: 11 | Status: SHIPPED | OUTPATIENT
Start: 2018-02-10 | End: 2019-02-15 | Stop reason: SDUPTHER

## 2018-02-10 RX ORDER — LOSARTAN POTASSIUM 100 MG/1
100 TABLET ORAL DAILY
Qty: 30 TAB | Refills: 11 | Status: SHIPPED | OUTPATIENT
Start: 2018-02-10 | End: 2018-04-02 | Stop reason: CLARIF

## 2018-02-10 RX ORDER — AMLODIPINE BESYLATE 10 MG/1
TABLET ORAL
Qty: 30 TAB | Refills: 3 | Status: SHIPPED | OUTPATIENT
Start: 2018-02-10 | End: 2018-06-06 | Stop reason: SDUPTHER

## 2018-02-10 RX ORDER — SIMVASTATIN 40 MG/1
40 TABLET, FILM COATED ORAL
Qty: 30 TAB | Refills: 11 | Status: SHIPPED | OUTPATIENT
Start: 2018-02-10 | End: 2019-02-06 | Stop reason: SDUPTHER

## 2018-02-10 NOTE — ASSESSMENT & PLAN NOTE
This condition is managed by Specialist.  Lab Results   Component Value Date/Time    WBC 2.9 02/08/2018 10:09 AM    HGB 13.3 02/08/2018 10:09 AM    HCT 40.8 02/08/2018 10:09 AM    PLATELET 925 84/48/2271 10:09 AM    Creatinine 1.39 02/08/2018 10:09 AM    BUN 14 02/08/2018 10:09 AM    Potassium 5.3 02/08/2018 10:09 AM

## 2018-03-27 ENCOUNTER — OFFICE VISIT (OUTPATIENT)
Dept: INTERNAL MEDICINE CLINIC | Age: 78
End: 2018-03-27

## 2018-03-27 VITALS
RESPIRATION RATE: 18 BRPM | SYSTOLIC BLOOD PRESSURE: 122 MMHG | HEIGHT: 71 IN | WEIGHT: 145 LBS | BODY MASS INDEX: 20.3 KG/M2 | HEART RATE: 71 BPM | DIASTOLIC BLOOD PRESSURE: 76 MMHG | OXYGEN SATURATION: 99 % | TEMPERATURE: 98 F

## 2018-03-27 DIAGNOSIS — Z13.39 SCREENING FOR ALCOHOLISM: ICD-10-CM

## 2018-03-27 DIAGNOSIS — D69.6 THROMBOCYTOPENIA (HCC): ICD-10-CM

## 2018-03-27 DIAGNOSIS — Z13.31 SCREENING FOR DEPRESSION: ICD-10-CM

## 2018-03-27 DIAGNOSIS — R41.82 ALTERED MENTAL STATUS, UNSPECIFIED ALTERED MENTAL STATUS TYPE: ICD-10-CM

## 2018-03-27 DIAGNOSIS — D64.9 ANEMIA, UNSPECIFIED TYPE: ICD-10-CM

## 2018-03-27 DIAGNOSIS — Z00.00 WELLNESS EXAMINATION: ICD-10-CM

## 2018-03-27 DIAGNOSIS — I10 ESSENTIAL HYPERTENSION: ICD-10-CM

## 2018-03-27 DIAGNOSIS — E78.5 DYSLIPIDEMIA: ICD-10-CM

## 2018-03-27 DIAGNOSIS — R41.0 CONFUSION: Primary | ICD-10-CM

## 2018-03-27 NOTE — PROGRESS NOTES
Chief Complaint   Patient presents with   [facility] 39 Visit     rm 7 doing well questioning dementia noted on visit      1. Have you been to the ER, urgent care clinic since your last visit? Hospitalized since your last visit? No    2. Have you seen or consulted any other health care providers outside of the [address] [place] [address] since your last visit? Include any pap smears or colon screening.  No

## 2018-03-27 NOTE — MR AVS SNAPSHOT
[facility] 
 
 
 [address] [address] 
[phone] Patient: Mick Espinoza. MRN: [id number] WGR:9[id number] Visit Information Date & Time Provider Department Dept. Phone Encounter #  
 3/27/2018  1:00 PM Domo Britton MD [facility] [phone] [id number] Your Appointments 8/9/2018  9:00 AM  
Any with Domo Britton MD  
[address] [place] [address] and Primary Care [facility] Appt Note: follow up  
 [address] 1 [facility]  
  
   
 [address] [address] [id number] Upcoming Health Maintenance Date Due  
 GLAUCOMA SCREENING Q2Y 10/1/2017 MEDICARE YEARLY EXAM 3/14/2018 DTaP/Tdap/Td series (2 - Td) 2/9/2027 Allergies as of 3/27/2018  Review Complete On: 3/27/2018 By: Hart Scheuermann Severity Noted Reaction Type Reactions Lisinopril  10/14/2014    Swelling Current Immunizations  Never Reviewed No immunizations on file. Not reviewed this visit You Were Diagnosed With   
  
 Codes Comments Confusion    -  Primary ICD-10-CM: R41.0 ICD-9-CM: 298.9 Essential hypertension     ICD-10-CM: I10 
ICD-9-CM: 401.9 Dyslipidemia     ICD-10-CM: E78.5 ICD-9-CM: 272.4 Thrombocytopenia ([address]     ICD-10-CM: D69.6 ICD-9-CM: 287.5 Anemia, unspecified type     ICD-10-CM: D64.9 ICD-9-CM: 490. 9 Vitals BP Pulse Temp Resp Height(growth percentile) Weight(growth percentile) 122/76 (BP 1 Location: Right arm, BP Patient Position: Sitting) 71 98 °F (36.7 °C) (Oral) 18 5' 11\" (1.803 m) 145 lb (65.8 kg) SpO2 BMI Smoking Status 99% 20.22 kg/m2 Never Smoker BMI and BSA Data Body Mass Index Body Surface Area  
 20.22 kg/m 2 1.82 m 2 Preferred Pharmacy Pharmacy Name Phone  [facility]/PHARMACY #[id number]- [place] [facility] [place] [phone] Your Updated Medication List  
  
   
This list is accurate as of 3/27/18  2:59 PM.  Always use your most recent med list. amLODIPine 10 mg tablet Commonly known as:  Carmita Devine TAKE 1 TABLET BY MOUTH EVERY DAY  
  
 finasteride 5 mg tablet Commonly known as:  PROSCAR  
TAKE 1 TABLET BY MOUTH EVERY DAY  
  
 losartan 100 mg tablet Commonly known as:  COZAAR Take 1 Tab by mouth daily. sildenafil (antihypertensive) 20 mg tablet Commonly known as:  REVATIO Daily as needed  
  
 simvastatin 40 mg tablet Commonly known as:  ZOCOR Take 1 Tab by mouth nightly. We Performed the Following 5-DRUG SCREEN, UR [id number] Custom] TSH 3RD GENERATION [24154 CPT(R)] VITAMIN B12 [id number] CPT(R)] To-Do List   
 03/29/2018 Imaging:  MRI BRAIN WO CONT Introducing [facility] & HEALTH SERVICES! Poornima Alarcon introduces P[employer] patient portal. Now you can access parts of your medical record, email your doctor's office, and request medication refills online. 1. In your internet browser, go to [email or web address] 2. Click on the First Time User? Click Here link in the Sign In box. You will see the New Member Sign Up page. 3. Enter your Pheedo Access Code exactly as it appears below. You will not need to use this code after youve completed the sign-up process. If you do not sign up before the expiration date, you must request a new code. · Pheedo Access Code: Nora Tim Expires: 5/9/2018 11:26 AM 
 
4. Enter the last four digits of your Social Security Number (xxxx) and Date of Birth (mm/dd/yyyy) as indicated and click Submit. You will be taken to the next sign-up page. 5. Create a Pheedo ID. This will be your Pheedo login ID and cannot be changed, so think of one that is secure and easy to remember. 6. Create a Pheedo password. You can change your password at any time. 7. Enter your Password Reset Question and Answer. This can be used at a later time if you forget your password. 8. Enter your e-mail address. You will receive e-mail notification when new information is available in [address] 9. Click Sign Up. You can now view and download portions of your medical record. 10. Click the Download Summary menu link to download a portable copy of your medical information. If you have questions, please visit the Frequently Asked Questions section of the [email or web address] website. Remember, [email or web address] is NOT to be used for urgent needs. For medical emergencies, dial 911. Now available from your iPhone and Android! Please provide this summary of care documentation to your next provider. Your primary care clinician is listed as Marly Varela. If you have any questions after today's visit, please call [phone].

## 2018-03-28 ENCOUNTER — HOSPITAL ENCOUNTER (OUTPATIENT)
Dept: MRI IMAGING | Age: 78
Discharge: HOME | End: 2018-03-28
Attending: INTERNAL MEDICINE
Payer: MEDICARE

## 2018-03-28 DIAGNOSIS — R41.0 CONFUSION: ICD-10-CM

## 2018-03-28 LAB
TSH SERPL DL<=0.005 MIU/L-ACNC: 1.04 UIU/ML (ref 0.45–4.5)
VIT B12 SERPL-MCNC: 914 PG/ML (ref 232–1245)

## 2018-03-28 PROCEDURE — 70551 MRI BRAIN STEM W/O DYE: CPT

## 2018-03-28 NOTE — PATIENT INSTRUCTIONS

## 2018-03-28 NOTE — PROGRESS NOTES
[address] [facility]  [place] [address]  [address] [place] [address]  Phone:  [phone]  Fax: [phone]       Chief Complaint   Patient presents with   [facility] Wellness Visit     rm 7 doing well questioning dementia noted on visit    . SUBJECTIVE:    Denzel Villegas is a 68 y.o. male comes in for return visit brought in by his family. They are concerned because he has become intermittently confused and quite forgetful. Above and beyond his daughter most recently dying, a very close brother  and a sister is quite sick. This change occurred fairly abruptly over a one week period of time. In speaking with the patient, he did not answer questions appropriately. He made one or two comments that just did not make any sense as far as responses were concerned. 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 He does have a past history of primary hypertension and dyslipidemia and has indeed been compliant with his medications. Current Outpatient Prescriptions   Medication Sig Dispense Refill    finasteride (PROSCAR) 5 mg tablet TAKE 1 TABLET BY MOUTH EVERY DAY 30 Tab 11    amLODIPine (NORVASC) 10 mg tablet TAKE 1 TABLET BY MOUTH EVERY DAY 30 Tab 3    simvastatin (ZOCOR) 40 mg tablet Take 1 Tab by mouth nightly. 30 Tab 11    losartan (COZAAR) 100 mg tablet Take 1 Tab by mouth daily. 30 Tab 11    sildenafil, antihypertensive, (REVATIO) 20 mg tablet Daily as needed 30 Tab 11     Past Medical History:   Diagnosis Date    Hypertension      History reviewed. No pertinent surgical history.   Allergies   Allergen Reactions    Lisinopril Swelling         REVIEW OF SYSTEMS:  General: negative for - chills or fever  ENT: negative for - headaches, nasal congestion or tinnitus  Respiratory: negative for - cough, hemoptysis, shortness of breath or wheezing  Cardiovascular : negative for - chest pain, edema, palpitations or shortness of breath  Gastrointestinal: negative for - abdominal pain, blood in stools, heartburn or nausea/vomiting  Genito-Urinary: no dysuria, trouble voiding, or hematuria  Musculoskeletal: negative for - gait disturbance, joint pain, joint stiffness or joint swelling  Neurological: no TIA or stroke symptoms  Hematologic: no bruises, no bleeding, no swollen glands  Integument: no lumps, mole changes, nail changes or rash  Endocrine: no malaise/lethargy or unexpected weight changes      Social History     Social History    Marital status:      Spouse name: N/A    Number of children: N/A    Years of education: N/A     Social History Main Topics    Smoking status: Never Smoker    Smokeless tobacco: Never Used    Alcohol use Yes    Drug use: No    Sexual activity: Yes     Partners: Female     Other Topics Concern    None     Social History Narrative     History reviewed. No pertinent family history. OBJECTIVE:    Visit Vitals    /76 (BP 1 Location: Right arm, BP Patient Position: Sitting)    Pulse 71    Temp 98 °F (36.7 °C) (Oral)    Resp 18    Ht 5' 11\" (1.803 m)    Wt 145 lb (65.8 kg)    SpO2 99%    BMI 20.22 kg/m2     CONSTITUTIONAL: well , well nourished, appears age appropriate  EYES: perrla, eom intact  ENMT:moist mucous membranes, pharynx clear  NECK: supple. Thyroid normal  RESPIRATORY: Chest: clear to ascultation and percussion   CARDIOVASCULAR: Heart: regular rate and rhythm  GASTROINTESTINAL: Abdomen: soft, bowel sounds active  HEMATOLOGIC: no pathological lymph nodes palpated  MUSCULOSKELETAL: Extremities: no edema, pulse 1+   INTEGUMENT: No unusual rashes or suspicious skin lesions noted. Nails appear normal.  NEUROLOGIC: non-focal exam, mildly positive frontal lobe signs glabellar and snout  MENTAL STATUS: alert and oriented, appropriate affect      ASSESSMENT:  1. Confusion    2. Altered mental status, unspecified altered mental status type    3. Essential hypertension    4. Dyslipidemia    5. Thrombocytopenia ([facility] [address] [facility]    6. Anemia, unspecified type    7. Screening for alcoholism    8. Screening for depression    9. Wellness examination        PLAN:    1. The altered mental status manifesting itself as intermittent confusion and memory deficit is quite suggestive of an acute process such as a stroke. Depression could also cause something similar to this. MRI of the brain will be obtained and then a decision will be made about what need be done after that. 2. Blood pressure is excellent today, no adjustments are made. 3. He will continue statin as prescribed. 4. 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 This is important because he is on steroids, about 30 mg currently. He has been on much higher doses of steroids previously and did not have any mental changes related to use of this medication. .  Orders Placed This Encounter    Depression Screen Annual    MRI BRAIN WO CONT    TSH 3RD GENERATION    VITAMIN B12         Follow-up Disposition:  Return in about 2 weeks (around 4/10/2018). Roney Chairez MD      This is the Subsequent Medicare Annual Wellness Exam, performed 12 months or more after the Initial AWV or the last Subsequent AWV    I have reviewed the patient's medical history in detail and updated the computerized patient record. History     Past Medical History:   Diagnosis Date    Hypertension       History reviewed. No pertinent surgical history. Current Outpatient Prescriptions   Medication Sig Dispense Refill    finasteride (PROSCAR) 5 mg tablet TAKE 1 TABLET BY MOUTH EVERY DAY 30 Tab 11    amLODIPine (NORVASC) 10 mg tablet TAKE 1 TABLET BY MOUTH EVERY DAY 30 Tab 3    simvastatin (ZOCOR) 40 mg tablet Take 1 Tab by mouth nightly. 30 Tab 11    losartan (COZAAR) 100 mg tablet Take 1 Tab by mouth daily.  30 Tab 11    sildenafil, antihypertensive, (REVATIO) 20 mg tablet Daily as needed 30 Tab 11     Allergies   Allergen Reactions    Lisinopril Swelling     History reviewed. No pertinent family history. Social History   Substance Use Topics    Smoking status: Never Smoker    Smokeless tobacco: Never Used    Alcohol use Yes     Patient Active Problem List   Diagnosis Code    Hypertension I10    Prostatism N40.0    Dyslipidemia E78.5    Dermatitis L30.9    Proteinuria R80.9    Thrombocytopenia ([address] D69.6       Depression Risk Factor Screening:     PHQ over the last two weeks 3/27/2018   PHQ Not Done -   Little interest or pleasure in doing things Not at all   Feeling down, depressed or hopeless Not at all   Total Score PHQ 2 0     Alcohol Risk Factor Screening: You do not drink alcohol or very rarely. Functional Ability and Level of Safety:   Hearing Loss  Hearing is good. Activities of Daily Living  The home contains: no safety equipment. Patient does total self care    Fall Risk  Fall Risk Assessment, last 12 mths 3/27/2018   Able to walk? Yes   Fall in past 12 months? No       Abuse Screen  Patient is not abused    Cognitive Screening   Evaluation of Cognitive Function:  Has your family/caregiver stated any concerns about your memory: no  Normal    Patient Care Team   Patient Care Team:  Amy Hussein MD as PCP - General (Internal Medicine)  Efrain Jones LPN as Ambulatory Care Navigator    Assessment/Plan   Education and counseling provided:  Are appropriate based on today's review and evaluation    Diagnoses and all orders for this visit:    1. Confusion  -     MRI BRAIN WO CONT; Future  -     TSH 3RD GENERATION  -     VITAMIN B12    2. Altered mental status, unspecified altered mental status type    3. Essential hypertension    4. Dyslipidemia    5. Thrombocytopenia ([address]    6. Anemia, unspecified type  -     VITAMIN B12    7. Screening for alcoholism  -     Annual  Alcohol Screen 15 min ()    8.  Screening for depression  -     Depression Screen Annual    9.  Wellness examination        Health Maintenance Due   Topic Date Due    GLAUCOMA SCREENING Q2Y  10/01/2017

## 2018-03-29 NOTE — PROGRESS NOTES
Tell patient and wife that MRI scan did not reveal the stroke or mass. I therefore want to start him on medication to improve his mental status hopefully. This is an antidepressant. Fluoxetine 20 mg daily.

## 2018-04-05 NOTE — TELEPHONE ENCOUNTER
Patient wife notified per Dr. Hermila Kwon and told that the mri showed no stroke or masses and that he was going to start him on fluoxetine 29mg 1 cap daily.

## 2018-04-06 RX ORDER — FLUOXETINE HYDROCHLORIDE 20 MG/1
20 CAPSULE ORAL DAILY
Qty: 30 CAP | Refills: 11 | Status: SHIPPED | OUTPATIENT
Start: 2018-04-06 | End: 2019-03-26 | Stop reason: SDUPTHER

## 2018-04-10 ENCOUNTER — OFFICE VISIT (OUTPATIENT)
Dept: INTERNAL MEDICINE CLINIC | Age: 78
End: 2018-04-10

## 2018-04-10 VITALS
OXYGEN SATURATION: 95 % | TEMPERATURE: 97.9 F | BODY MASS INDEX: 20.66 KG/M2 | WEIGHT: 147.6 LBS | DIASTOLIC BLOOD PRESSURE: 77 MMHG | HEART RATE: 66 BPM | HEIGHT: 71 IN | SYSTOLIC BLOOD PRESSURE: 139 MMHG | RESPIRATION RATE: 16 BRPM

## 2018-04-10 DIAGNOSIS — D69.6 THROMBOCYTOPENIA (HCC): ICD-10-CM

## 2018-04-10 DIAGNOSIS — F32.A DEPRESSION, UNSPECIFIED DEPRESSION TYPE: Primary | ICD-10-CM

## 2018-04-10 DIAGNOSIS — I10 ESSENTIAL HYPERTENSION: ICD-10-CM

## 2018-04-10 NOTE — PROGRESS NOTES
Chief Complaint   Patient presents with    Altered mental status     1 week follow up from MRI     1. Have you been to the ER, urgent care clinic since your last visit? Hospitalized since your last visit? No    2. Have you seen or consulted any other health care providers outside of the [address] [place] [address] since your last visit? Include any pap smears or colon screening.  No

## 2018-04-10 NOTE — PROGRESS NOTES
Chief Complaint   Patient presents with    Altered mental status     1 week follow up from MRI   . SUBECTIVE:    Jamari Reilly is a 68 y.o. male comes in for follow-up accompanied by his daughter. The daughter is concerned because two of his sons have adverse effects to the fluoxitine. The patient however states that he wants to continue with the medication for now. He has only been taking it for the last three days. I concluded that his current symptoms are most likely related to depression. He has had a lot of catastrophic events happen to his family of late. I initially though there could be an element of a CVA particularly in view of his speech pattern which has since improved. Current Outpatient Prescriptions   Medication Sig Dispense Refill    FLUoxetine (PROZAC) 20 mg capsule Take 1 Cap by mouth daily. 30 Cap 11    losartan (COZAAR) 100 mg tablet TAKE 1 TAB BY MOUTH DAILY. 30 Tab 11    finasteride (PROSCAR) 5 mg tablet TAKE 1 TABLET BY MOUTH EVERY DAY 30 Tab 11    amLODIPine (NORVASC) 10 mg tablet TAKE 1 TABLET BY MOUTH EVERY DAY 30 Tab 3    simvastatin (ZOCOR) 40 mg tablet Take 1 Tab by mouth nightly. 30 Tab 11    sildenafil, antihypertensive, (REVATIO) 20 mg tablet Daily as needed 30 Tab 11     Past Medical History:   Diagnosis Date    Hypertension      History reviewed. No pertinent surgical history.   Allergies   Allergen Reactions    Lisinopril Swelling       REVIEW OF SYSTEMS:  Review of Systems - Negative except   ENT ROS: negative for - headaches, hearing change, nasal congestion, oral lesions, tinnitus, visual changes or   Respiratory ROS: no cough, shortness of breath, or wheezing  Cardiovascular ROS: no chest pain or dyspnea on exertion  Gastrointestinal ROS: no abdominal pain, change in bowel habits, or black or blood  Genito-Urinary ROS: no dysuria, trouble voiding, or hematuria  Musculoskeletal ROS: negative  Neurological ROS: no TIA or stroke symptoms      Social History     Social History    Marital status:      Spouse name: N/A    Number of children: N/A    Years of education: N/A     Social History Main Topics    Smoking status: Never Smoker    Smokeless tobacco: Never Used    Alcohol use Yes    Drug use: No    Sexual activity: Yes     Partners: Female     Other Topics Concern    None     Social History Narrative   r  History reviewed. No pertinent family history. OBJECTIVE:  Visit Vitals    /77 (BP 1 Location: Left arm, BP Patient Position: Sitting)    Pulse 66    Temp 97.9 °F (36.6 °C) (Oral)    Resp 16    Ht 5' 11\" (1.803 m)    Wt 147 lb 9.6 oz (67 kg)    SpO2 95%    BMI 20.59 kg/m2     ENT: perrla,  eom intact  NECK: supple. Thyroid normal, no JVD  CHEST: clear to ascultation and percussion   HEART: regular rate and rhythm  ABD: soft, bowel sounds active,   EXTREMITIES: no edema, pulse 1+  INTEGUMENT: clear      ASSESSMENT:  1. Depression, unspecified depression type    2. Essential hypertension    3. Thrombocytopenia ([address]        PLAN:    1. Currently depression is the primary diagnosis at this point. I do not find any pathologic CNS condition that is leading to the mental changes. This can simulate dementia. He will remain on the fluoxitine 20 mg every day. He would prefer this for now. I explained to the family that the adverse effects experienced by the two sons does not necessarily mean that he is going to get these but they will watch for them. 2. For his thrombocytopenia he is on low dose prednisone. I do not think this is adversely affecting him mentally. 3. He will continue his antihypertensive medication as prescribed. 4. He has lost weight and I encouraged him to eat more. Follow-up Disposition:  Return in about 4 weeks (around 5/8/2018).       Gema Unger MD

## 2018-04-10 NOTE — MR AVS SNAPSHOT
[address] [facility] 
 
 
 [address] [address] 
[phone] Patient: Dre Burk. MRN: [id number] AXP:2/06/2595 Visit Information Date & Time Provider Department Dept. Phone Encounter #  
 4/10/2018 11:30 AM Lin Suggs MD [facility] Medicine and [place] [address] [id number] Your Appointments 8/9/2018  9:00 AM  
Any with Lin Suggs MD  
[address] [place] [address] and Primary Care [facility] Appt Note: follow up  
 [address] 1 [facility]  
  
   
 [address] [id number] Upcoming Health Maintenance Date Due  
 GLAUCOMA SCREENING Q2Y 10/1/2017 MEDICARE YEARLY EXAM 3/28/2019 DTaP/Tdap/Td series (2 - Td) 2/9/2027 Allergies as of 4/10/2018  Review Complete On: 4/10/2018 By: Eleazar Paz Severity Noted Reaction Type Reactions Lisinopril  10/14/2014    Swelling Current Immunizations  Never Reviewed No immunizations on file. Not reviewed this visit Vitals BP Pulse Temp Resp Height(growth percentile) Weight(growth percentile) 139/77 (BP 1 Location: Left arm, BP Patient Position: Sitting) 66 97.9 °F (36.6 °C) (Oral) 16 5' 11\" (1.803 m) 147 lb 9.6 oz (67 kg) SpO2 BMI Smoking Status 95% 20.59 kg/m2 Never Smoker Vitals History BMI and BSA Data Body Mass Index Body Surface Area 20.59 kg/m 2 1.83 m 2 Preferred Pharmacy Pharmacy Name Phone [facility]/PHARMACY #[id number]- [facility] 23 [phone] Your Updated Medication List  
  
   
This list is accurate as of 4/10/18 12:47 PM.  Always use your most recent med list. amLODIPine 10 mg tablet Commonly known as:  Kerry Martinsbe TAKE 1 TABLET BY MOUTH EVERY DAY  
  
 finasteride 5 mg tablet Commonly known as:  PROSCAR  
 TAKE 1 TABLET BY MOUTH EVERY DAY FLUoxetine 20 mg capsule Commonly known as:  PROzac Take 1 Cap by mouth daily. losartan 100 mg tablet Commonly known as:  COZAAR  
TAKE 1 TAB BY MOUTH DAILY. sildenafil (antihypertensive) 20 mg tablet Commonly known as:  REVATIO Daily as needed  
  
 simvastatin 40 mg tablet Commonly known as:  ZOCOR Take 1 Tab by mouth nightly. Introducing \[facility]\"" & HEALTH SERVICES! [facility] introduces [employer] patient portal. Now you can access parts of your medical record, email your doctor's office, and request medication refills online. 1. In your internet browser, go to [email or web address] 2. Click on the First Time User? Click Here link in the Sign In box. You will see the New Member Sign Up page. 3. Enter your Softricity Access Code exactly as it appears below. You will not need to use this code after youve completed the sign-up process. If you do not sign up before the expiration date, you must request a new code. · Softricity Access Code: Korin Benoit Expires: 5/9/2018 11:26 AM 
 
4. Enter the last four digits of your Social Security Number (xxxx) and Date of Birth (mm/dd/yyyy) as indicated and click Submit. You will be taken to the next sign-up page. 5. Create a Softricity ID. This will be your Softricity login ID and cannot be changed, so think of one that is secure and easy to remember. 6. Create a Softricity password. You can change your password at any time. 7. Enter your Password Reset Question and Answer. This can be used at a later time if you forget your password. 8. Enter your e-mail address. You will receive e-mail notification when new information is available in [address] 9. Click Sign Up. You can now view and download portions of your medical record. 10. Click the Download Summary menu link to download a portable copy of your medical information. If you have questions, please visit the Frequently Asked Questions section of the [employer] website. Remember, [employer] is NOT to be used for urgent needs. For medical emergencies, dial 911. Now available from your iPhone and Android! Please provide this summary of care documentation to your next provider. Your primary care clinician is listed as Marly Varela. If you have any questions after today's visit, please call [phone].

## 2018-05-15 ENCOUNTER — OFFICE VISIT (OUTPATIENT)
Dept: INTERNAL MEDICINE CLINIC | Age: 78
End: 2018-05-15

## 2018-05-15 VITALS
TEMPERATURE: 98.2 F | RESPIRATION RATE: 16 BRPM | DIASTOLIC BLOOD PRESSURE: 80 MMHG | WEIGHT: 149.4 LBS | OXYGEN SATURATION: 97 % | HEIGHT: 71 IN | SYSTOLIC BLOOD PRESSURE: 142 MMHG | HEART RATE: 57 BPM | BODY MASS INDEX: 20.92 KG/M2

## 2018-05-15 DIAGNOSIS — F32.A DEPRESSION, UNSPECIFIED DEPRESSION TYPE: Primary | ICD-10-CM

## 2018-05-15 DIAGNOSIS — D69.6 THROMBOCYTOPENIA (HCC): ICD-10-CM

## 2018-05-15 DIAGNOSIS — E78.5 DYSLIPIDEMIA: ICD-10-CM

## 2018-05-15 DIAGNOSIS — D69.3 CHRONIC ITP (IDIOPATHIC THROMBOCYTOPENIA) (HCC): ICD-10-CM

## 2018-05-15 DIAGNOSIS — I10 ESSENTIAL HYPERTENSION: ICD-10-CM

## 2018-05-15 NOTE — PROGRESS NOTES
[address] [facility]  [place] [address]  [address] [place] [address]  Phone:  [phone]  Fax: [phone]       Chief Complaint   Patient presents with    Depression     1 month follow up    . SUBJECTIVE:    Luis Luz is a 68 y.o. male He comes in stating that he feels much better. He is smiling today and we talked about the problems that he had. He actually would lose his way home when he was driving, which was extremely frustrating for him. This apparently has improved significantly such that this is no longer an issue. He is much more responsive to questions and answers them quite readily. There is no flight of ideas today. He has a past history of primary hypertension and dyslipidemia. He was also concerned about his weight loss, but he has actually gained two pounds since his last visit. He will also follow up with hematology for his ITP. Current Outpatient Prescriptions   Medication Sig Dispense Refill    FLUoxetine (PROZAC) 20 mg capsule Take 1 Cap by mouth daily. 30 Cap 11    losartan (COZAAR) 100 mg tablet TAKE 1 TAB BY MOUTH DAILY. 30 Tab 11    finasteride (PROSCAR) 5 mg tablet TAKE 1 TABLET BY MOUTH EVERY DAY 30 Tab 11    amLODIPine (NORVASC) 10 mg tablet TAKE 1 TABLET BY MOUTH EVERY DAY 30 Tab 3    simvastatin (ZOCOR) 40 mg tablet Take 1 Tab by mouth nightly. 30 Tab 11    sildenafil, antihypertensive, (REVATIO) 20 mg tablet Daily as needed 30 Tab 11     Past Medical History:   Diagnosis Date    Hypertension      History reviewed. No pertinent surgical history.   Allergies   Allergen Reactions    Lisinopril Swelling         REVIEW OF SYSTEMS:  General: negative for - chills or fever  ENT: negative for - headaches, nasal congestion or tinnitus  Respiratory: negative for - cough, hemoptysis, shortness of breath or wheezing  Cardiovascular : negative for - chest pain, edema, palpitations or shortness of breath  Gastrointestinal: negative for - abdominal pain, blood in stools, heartburn or nausea/vomiting  Genito-Urinary: no dysuria, trouble voiding, or hematuria  Musculoskeletal: negative for - gait disturbance, joint pain, joint stiffness or joint swelling  Neurological: no TIA or stroke symptoms  Hematologic: no bruises, no bleeding, no swollen glands  Integument: no lumps, mole changes, nail changes or rash  Endocrine: no malaise/lethargy or unexpected weight changes      Social History     Social History    Marital status:      Spouse name: N/A    Number of children: N/A    Years of education: N/A     Social History Main Topics    Smoking status: Never Smoker    Smokeless tobacco: Never Used    Alcohol use Yes    Drug use: No    Sexual activity: Yes     Partners: Female     Other Topics Concern    None     Social History Narrative     History reviewed. No pertinent family history. OBJECTIVE:    Visit Vitals    /80    Pulse (!) 57    Temp 98.2 °F (36.8 °C) (Oral)    Resp 16    Ht 5' 11\" (1.803 m)    Wt 149 lb 6.4 oz (67.8 kg)    SpO2 97%    BMI 20.84 kg/m2     CONSTITUTIONAL: well , well nourished, appears age appropriate  EYES: perrla, eom intact  ENMT:moist mucous membranes, pharynx clear  NECK: supple. Thyroid normal  RESPIRATORY: Chest: clear to ascultation and percussion   CARDIOVASCULAR: Heart: regular rate and rhythm  GASTROINTESTINAL: Abdomen: soft, bowel sounds active  HEMATOLOGIC: no pathological lymph nodes palpated  MUSCULOSKELETAL: Extremities: no edema, pulse 1+   INTEGUMENT: No unusual rashes or suspicious skin lesions noted. Nails appear normal.  NEUROLOGIC: non-focal exam   MENTAL STATUS: alert and oriented, appropriate affect      ASSESSMENT:  1. Depression, unspecified depression type    2. Essential hypertension    3. Dyslipidemia    4. Thrombocytopenia ([facility] [address]    5. Chronic ITP (idiopathic thrombocytopenia) (HCC)        PLAN:    1. His depression is doing well. He will continue the Fluoxetine 20 mg daily. 2. Blood pressure is excellent today and no adjustments were made. 3. He will continue his statin as prescribed in view of his primary cardiovascular risk prevention. 4. There has been meaningful weight loss and if anything he has gained two additional pounds. 5. He will follow up with hematology regarding his thrombocytopenia from ITP. Follow-up Disposition:  Return in about 3 months (around 8/15/2018).       Franky Tamez MD

## 2018-05-15 NOTE — PROGRESS NOTES
Chief Complaint   Patient presents with    Depression     1 month follow up      1. Have you been to the ER, urgent care clinic since your last visit? Hospitalized since your last visit? No    2. Have you seen or consulted any other health care providers outside of the [address] [place] [address] since your last visit? Include any pap smears or colon screening.  No

## 2018-05-15 NOTE — MR AVS SNAPSHOT
[address] [facility] 
 
 
 [address] [id number] 
[phone] Patient: Heaven Rene. MRN: [id number] QZS:0/39/3507 Visit Information Date & Time Provider Department Dept. Phone Encounter #  
 5/15/2018  9:30 AM MD Ailin [facility] [phone] [id number] Follow-up Instructions Return in about 3 months (around 8/15/2018). Your Appointments 8/21/2018  9:45 AM  
Any with Alpesh Yepez MD  
[address] [place] [address] and Primary Care [facility]) Appt Note: 3 month f/u  
 [address] [facility]  
  
   
 [address] [id number] Upcoming Health Maintenance Date Due  
 GLAUCOMA SCREENING Q2Y 8/15/2018* Influenza Age 5 to Adult 8/1/2018 MEDICARE YEARLY EXAM 3/28/2019 DTaP/Tdap/Td series (2 - Td) 2/9/2027 *Topic was postponed. The date shown is not the original due date. Allergies as of 5/15/2018  Review Complete On: 5/15/2018 By: Lisa Quintanilla Severity Noted Reaction Type Reactions Lisinopril  10/14/2014    Swelling Current Immunizations  Never Reviewed No immunizations on file. Not reviewed this visit You Were Diagnosed With   
  
 Codes Comments Depression, unspecified depression type    -  Primary ICD-10-CM: F32.9 ICD-9-CM: 119 Essential hypertension     ICD-10-CM: I10 
ICD-9-CM: 401.9 Dyslipidemia     ICD-10-CM: E78.5 ICD-9-CM: 272.4 Thrombocytopenia ([address]     ICD-10-CM: D69.6 ICD-9-CM: 287.5 Chronic ITP (idiopathic thrombocytopenia) (HCC)     ICD-10-CM: D69.3 ICD-9-CM: 287.31 Vitals BP Pulse Temp Resp Height(growth percentile) Weight(growth percentile) 142/80 (!) 57 98.2 °F (36.8 °C) (Oral) 16 5' 11\" (1.803 m) 149 lb 6.4 oz (67.8 kg) SpO2 BMI Smoking Status 97% 20.84 kg/m2 Never Smoker Vitals History BMI and BSA Data Body Mass Index Body Surface Area  
 20.84 kg/m 2 1.84 m 2 Preferred Pharmacy Pharmacy Name Phone [id number] [address] [phone] Your Updated Medication List  
  
   
This list is accurate as of 5/15/18 10:43 AM.  Always use your most recent med list. amLODIPine 10 mg tablet Commonly known as:  Kittredge Huston TAKE 1 TABLET BY MOUTH EVERY DAY  
  
 finasteride 5 mg tablet Commonly known as:  PROSCAR  
TAKE 1 TABLET BY MOUTH EVERY DAY FLUoxetine 20 mg capsule Commonly known as:  PROzac Take 1 Cap by mouth daily. losartan 100 mg tablet Commonly known as:  COZAAR  
TAKE 1 TAB BY MOUTH DAILY. sildenafil (antihypertensive) 20 mg tablet Commonly known as:  REVATIO Daily as needed  
  
 simvastatin 40 mg tablet Commonly known as:  ZOCOR Take 1 Tab by mouth nightly. Follow-up Instructions Return in about 3 months (around 8/15/2018). Introducing [facility] SERVICES! Katerina Charles introduces [employer] patient portal. Now you can access parts of your medical record, email your doctor's office, and request medication refills online. 1. In your internet browser, go to [email or web address] 2. Click on the First Time User? Click Here link in the Sign In box. You will see the New Member Sign Up page. 3. Enter your [employer] Access Code exactly as it appears below. You will not need to use this code after youve completed the sign-up process. If you do not sign up before the expiration date, you must request a new code. · [employer] Access Code: [email or web address] Expires: 8/13/2018 10:43 AM 
 
4. Enter the last four digits of your Social Security Number (xxxx) and Date of Birth (mm/dd/yyyy) as indicated and click Submit. You will be taken to the next sign-up page. 5. Create a Abundance Generation ID. This will be your [employer] login ID and cannot be changed, so think of one that is secure and easy to remember. 6. Create a [employer] password. You can change your password at any time. 7. Enter your Password Reset Question and Answer. This can be used at a later time if you forget your password. 8. Enter your e-mail address. You will receive e-mail notification when new information is available in [address] 9. Click Sign Up. You can now view and download portions of your medical record. 10. Click the Download Summary menu link to download a portable copy of your medical information. If you have questions, please visit the Frequently Asked Questions section of the [employer] website. Remember, [employer] is NOT to be used for urgent needs. For medical emergencies, dial 911. Now available from your iPhone and Android! Please provide this summary of care documentation to your next provider. Your primary care clinician is listed as Marly Varela. If you have any questions after today's visit, please call [phone].

## 2018-08-21 ENCOUNTER — OFFICE VISIT (OUTPATIENT)
Dept: INTERNAL MEDICINE CLINIC | Age: 78
End: 2018-08-21

## 2018-08-21 VITALS
WEIGHT: 149.1 LBS | RESPIRATION RATE: 17 BRPM | BODY MASS INDEX: 20.87 KG/M2 | DIASTOLIC BLOOD PRESSURE: 73 MMHG | SYSTOLIC BLOOD PRESSURE: 129 MMHG | TEMPERATURE: 97.7 F | HEIGHT: 71 IN | OXYGEN SATURATION: 100 % | HEART RATE: 58 BPM

## 2018-08-21 DIAGNOSIS — D69.3 CHRONIC ITP (IDIOPATHIC THROMBOCYTOPENIA) (HCC): ICD-10-CM

## 2018-08-21 DIAGNOSIS — E78.5 DYSLIPIDEMIA: ICD-10-CM

## 2018-08-21 DIAGNOSIS — F32.A DEPRESSION, UNSPECIFIED DEPRESSION TYPE: Primary | ICD-10-CM

## 2018-08-21 DIAGNOSIS — I10 ESSENTIAL HYPERTENSION: ICD-10-CM

## 2018-08-21 PROBLEM — D69.6 THROMBOCYTOPENIA (HCC): Status: RESOLVED | Noted: 2017-02-10 | Resolved: 2018-08-21

## 2018-08-21 NOTE — PROGRESS NOTES
[address] [facility]  [place] [address]  [address] [facility] [address]  Phone:  [phone]  Fax: [phone]       Chief Complaint   Patient presents with    Hypertension    Cholesterol Problem    Depression   . SUBJECTIVE:    Rainey Krabbe is a 68 y.o. male comes in for return visit stating he has been doing well. His depression has improved significantly. He is maintained on fluoxitine with no adverse effects. The precipitating factor as far as his depression is concerned was the death of his daughter who was 32years of age. He remains quite physically active and is engaged in his usual activities. He has a past history of primary hypertension, dyslipidemia and prostatism. Current Outpatient Prescriptions   Medication Sig Dispense Refill    amLODIPine (NORVASC) 10 mg tablet TAKE 1 TABLET BY MOUTH EVERY DAY 90 Tab 3    FLUoxetine (PROZAC) 20 mg capsule Take 1 Cap by mouth daily. 30 Cap 11    losartan (COZAAR) 100 mg tablet TAKE 1 TAB BY MOUTH DAILY. 30 Tab 11    finasteride (PROSCAR) 5 mg tablet TAKE 1 TABLET BY MOUTH EVERY DAY 30 Tab 11    simvastatin (ZOCOR) 40 mg tablet Take 1 Tab by mouth nightly. 30 Tab 11    sildenafil, antihypertensive, (REVATIO) 20 mg tablet Daily as needed 30 Tab 11     Past Medical History:   Diagnosis Date    Hypertension      History reviewed. No pertinent surgical history.   Allergies   Allergen Reactions    Lisinopril Swelling         REVIEW OF SYSTEMS:  General: negative for - chills or fever  ENT: negative for - headaches, nasal congestion or tinnitus  Respiratory: negative for - cough, hemoptysis, shortness of breath or wheezing  Cardiovascular : negative for - chest pain, edema, palpitations or shortness of breath  Gastrointestinal: negative for - abdominal pain, blood in stools, heartburn or nausea/vomiting  Genito-Urinary: no dysuria, trouble voiding, or hematuria  Musculoskeletal: negative for - gait disturbance, joint pain, joint stiffness or joint swelling  Neurological: no TIA or stroke symptoms  Hematologic: no bruises, no bleeding, no swollen glands  Integument: no lumps, mole changes, nail changes or rash  Endocrine: no malaise/lethargy or unexpected weight changes      Social History     Social History    Marital status:      Spouse name: N/A    Number of children: 2    Years of education: N/A     Occupational History    Retired teacher      Social History Main Topics    Smoking status: Never Smoker    Smokeless tobacco: Never Used    Alcohol use Yes    Drug use: No    Sexual activity: Yes     Partners: Female     Other Topics Concern    None     Social History Narrative     History reviewed. No pertinent family history. OBJECTIVE:    Visit Vitals    /73 (BP 1 Location: Left arm, BP Patient Position: Sitting)    Pulse (!) 58    Temp 97.7 °F (36.5 °C) (Oral)    Resp 17    Ht 5' 11\" (1.803 m)    Wt 149 lb 1.6 oz (67.6 kg)    SpO2 100%    BMI 20.8 kg/m2     CONSTITUTIONAL: well , well nourished, appears age appropriate  EYES: perrla, eom intact  ENMT:moist mucous membranes, pharynx clear  NECK: supple. Thyroid normal  RESPIRATORY: Chest: clear to ascultation and percussion   CARDIOVASCULAR: Heart: regular rate and rhythm  GASTROINTESTINAL: Abdomen: soft, bowel sounds active  HEMATOLOGIC: no pathological lymph nodes palpated  MUSCULOSKELETAL: Extremities: no edema, pulse 1+   INTEGUMENT: No unusual rashes or suspicious skin lesions noted. Nails appear normal.  NEUROLOGIC: non-focal exam   MENTAL STATUS: alert and oriented, appropriate affect      ASSESSMENT:  1. Depression, unspecified depression type    2. Chronic ITP (idiopathic thrombocytopenia) (HCC)    3. Essential hypertension    4. Dyslipidemia        PLAN:    1. The patient's depression is doing well. He will continue the fluoxitine as prescribed.     2. His chronic ITP is cared for by the hematologist.  His prednisone dose is gradually decreasing as a direct result of continued improvement in his thrombocytopenia. 3. Blood pressure is excellent today, no adjustments are made. 4. He will continue a statin as prescribed in view of his increased cardiovascular risk. 5. I encouraged him to remain as physically active as possible. Follow-up Disposition:  Return in about 6 months (around 2/21/2019).       Scot Soliz MD

## 2018-08-21 NOTE — MR AVS SNAPSHOT
[address] [facility] 
 
 
 [address] [id number] 
[phone] Patient: Lino Smith. MRN: [id number] GMO:[birth year] Visit Information Date & Time Provider Department Dept. Phone Encounter #  
 8/21/2018  9:45 AM Tea Barone [facility] [phone] [id number] Follow-up Instructions Return in about 6 months (around 2/21/2019). Your Appointments 2/26/2019  9:00 AM  
Any with Shadia Lee MD  
[address] [place] [address] and Primary Care [facility]) Appt Note: 6 month f/u  
 [address] 1 [facility]  
  
   
 [address] [id number] Upcoming Health Maintenance Date Due  
 GLAUCOMA SCREENING Q2Y 10/1/2017 Influenza Age 5 to Adult 8/1/2018 MEDICARE YEARLY EXAM 3/28/2019 DTaP/Tdap/Td series (2 - Td) 2/9/2027 Allergies as of 8/21/2018  Review Complete On: 8/21/2018 By: Ronel Hill LPN Severity Noted Reaction Type Reactions Lisinopril  10/14/2014    Swelling Current Immunizations  Never Reviewed No immunizations on file. Not reviewed this visit You Were Diagnosed With   
  
 Codes Comments Depression, unspecified depression type    -  Primary ICD-10-CM: F32.9 ICD-9-CM: 598 Chronic ITP (idiopathic thrombocytopenia) (HCC)     ICD-10-CM: D69.3 ICD-9-CM: 287.31 Essential hypertension     ICD-10-CM: I10 
ICD-9-CM: 401.9 Dyslipidemia     ICD-10-CM: E78.5 ICD-9-CM: 272.4 Vitals BP Pulse Temp Resp Height(growth percentile) Weight(growth percentile) 129/73 (BP 1 Location: Left arm, BP Patient Position: Sitting) (!) 58 97.7 °F (36.5 °C) (Oral) 17 5' 11\" (1.803 m) 149 lb 1.6 oz (67.6 kg) SpO2 BMI Smoking Status 100% 20.8 kg/m2 Never Smoker Vitals History BMI and BSA Data  Body Mass Index Body Surface Area  
 20.8 kg/m 2 1.84 m 2  
  
  
 Preferred Pharmacy Pharmacy Name Phone [id number] [address] [phone] Your Updated Medication List  
  
   
This list is accurate as of 8/21/18 10:44 AM.  Always use your most recent med list. amLODIPine 10 mg tablet Commonly known as:  Payne Cater TAKE 1 TABLET BY MOUTH EVERY DAY  
  
 finasteride 5 mg tablet Commonly known as:  PROSCAR  
TAKE 1 TABLET BY MOUTH EVERY DAY FLUoxetine 20 mg capsule Commonly known as:  PROzac Take 1 Cap by mouth daily. losartan 100 mg tablet Commonly known as:  COZAAR  
TAKE 1 TAB BY MOUTH DAILY. sildenafil (antihypertensive) 20 mg tablet Commonly known as:  REVATIO Daily as needed  
  
 simvastatin 40 mg tablet Commonly known as:  ZOCOR Take 1 Tab by mouth nightly. Follow-up Instructions Return in about 6 months (around 2/21/2019). Introducing [facility] SERVICES! [employer] introduces [employer] patient portal. Now you can access parts of your medical record, email your doctor's office, and request medication refills online. 1. In your internet browser, go to [email or web address] 2. Click on the First Time User? Click Here link in the Sign In box. You will see the New Member Sign Up page. 3. Enter your [employer] Access Code exactly as it appears below. You will not need to use this code after youve completed the sign-up process. If you do not sign up before the expiration date, you must request a new code. · [employer] Access Code: [email or web address] Expires: 11/19/2018 10:44 AM 
 
4. Enter the last four digits of your Social Security Number (xxxx) and Date of Birth (mm/dd/yyyy) as indicated and click Submit. You will be taken to the next sign-up page. 5. Create a [employer] ID. This will be your [employer] login ID and cannot be changed, so think of one that is secure and easy to remember. 6. Create a Pulse.io password. You can change your password at any time. 7. Enter your Password Reset Question and Answer. This can be used at a later time if you forget your password. 8. Enter your e-mail address. You will receive e-mail notification when new information is available in [address] 9. Click Sign Up. You can now view and download portions of your medical record. 10. Click the Download Summary menu link to download a portable copy of your medical information. If you have questions, please visit the Frequently Asked Questions section of the [email or web address] website. Remember, Pulse.io is NOT to be used for urgent needs. For medical emergencies, dial 911. Now available from your iPhone and Android! Please provide this summary of care documentation to your next provider. Your primary care clinician is listed as Marly Varela. If you have any questions after today's visit, please call [phone].

## 2018-08-21 NOTE — PROGRESS NOTES
Chief Complaint   Patient presents with    Hypertension    Cholesterol Problem    Depression       1. Have you been to the ER, urgent care clinic since your last visit? Hospitalized since your last visit? No    2. Have you seen or consulted any other health care providers outside of the [address] [place] [address] since your last visit? Include any pap smears or colon screening. No    Body mass index is 20.8 kg/(m^2).

## 2018-08-25 PROBLEM — F32.A MILD DEPRESSION: Status: ACTIVE | Noted: 2018-08-25

## 2018-10-30 RX ORDER — SILDENAFIL CITRATE 20 MG/1
TABLET ORAL
Qty: 30 TAB | Refills: 11 | Status: SHIPPED | OUTPATIENT
Start: 2018-10-30 | End: 2019-11-18 | Stop reason: SDUPTHER

## 2019-02-06 RX ORDER — SIMVASTATIN 40 MG/1
40 TABLET, FILM COATED ORAL
Qty: 30 TAB | Refills: 11 | Status: SHIPPED | OUTPATIENT
Start: 2019-02-06 | End: 2020-02-09

## 2019-02-15 DIAGNOSIS — N40.0 PROSTATISM: ICD-10-CM

## 2019-02-16 RX ORDER — FINASTERIDE 5 MG/1
TABLET, FILM COATED ORAL
Qty: 30 TAB | Refills: 11 | Status: SHIPPED | OUTPATIENT
Start: 2019-02-16 | End: 2020-02-09

## 2019-03-05 ENCOUNTER — OFFICE VISIT (OUTPATIENT)
Dept: INTERNAL MEDICINE CLINIC | Age: 79
End: 2019-03-05

## 2019-03-05 VITALS
HEART RATE: 59 BPM | OXYGEN SATURATION: 99 % | DIASTOLIC BLOOD PRESSURE: 73 MMHG | RESPIRATION RATE: 16 BRPM | WEIGHT: 151.6 LBS | TEMPERATURE: 97.8 F | SYSTOLIC BLOOD PRESSURE: 127 MMHG | BODY MASS INDEX: 21.22 KG/M2 | HEIGHT: 71 IN

## 2019-03-05 DIAGNOSIS — I10 ESSENTIAL HYPERTENSION: ICD-10-CM

## 2019-03-05 DIAGNOSIS — R41.3 SHORT-TERM MEMORY LOSS: ICD-10-CM

## 2019-03-05 DIAGNOSIS — D69.3 CHRONIC ITP (IDIOPATHIC THROMBOCYTOPENIA) (HCC): ICD-10-CM

## 2019-03-05 DIAGNOSIS — F32.A MILD DEPRESSION: Primary | ICD-10-CM

## 2019-03-05 DIAGNOSIS — E78.5 DYSLIPIDEMIA: ICD-10-CM

## 2019-03-05 DIAGNOSIS — N40.0 BENIGN PROSTATIC HYPERPLASIA WITHOUT LOWER URINARY TRACT SYMPTOMS: ICD-10-CM

## 2019-03-05 DIAGNOSIS — R80.9 PROTEINURIA, UNSPECIFIED TYPE: ICD-10-CM

## 2019-03-05 NOTE — PROGRESS NOTES
Chief Complaint   Patient presents with    Depression     6 month follow up      1. Have you been to the ER, urgent care clinic since your last visit? Hospitalized since your last visit? No    2. Have you seen or consulted any other health care providers outside of the [address] [place] [address] since your last visit? Include any pap smears or colon screening.  No

## 2019-03-06 LAB
BUN SERPL-MCNC: 17 MG/DL (ref 8–27)
BUN/CREAT SERPL: 14 (ref 10–24)
CALCIUM SERPL-MCNC: 9.5 MG/DL (ref 8.6–10.2)
CHLORIDE SERPL-SCNC: 101 MMOL/L (ref 96–106)
CO2 SERPL-SCNC: 26 MMOL/L (ref 20–29)
CREAT SERPL-MCNC: 1.21 MG/DL (ref 0.76–1.27)
GLUCOSE SERPL-MCNC: 78 MG/DL (ref 65–99)
POTASSIUM SERPL-SCNC: 4.7 MMOL/L (ref 3.5–5.2)
PROT UR-MCNC: 61.6 MG/DL
PSA SERPL-MCNC: 1.4 NG/ML (ref 0–4)
SODIUM SERPL-SCNC: 143 MMOL/L (ref 134–144)

## 2019-03-06 NOTE — PROGRESS NOTES
[address] [place] [address] and Primary Care  [place] [address]  [address] [facility] [address]  Phone:  [phone]  Fax: [phone]       Chief Complaint   Patient presents with    Depression     6 month follow up    . SUBJECTIVE:    Mitchell Barnett is a 66 y.o. male Comes in for return visit stating that he is doing reasonably well. He is able to go places by himself currently, although he states that on occasion he has to go longer routes because he cannot remember the shorter ones. He is able to participate in all of his previous activities. He is quite appropriate today with nothing that suggests the ideations that previously occurred. I did, however, note that he does have a problem with his short term memory. This persists. He has a past history of primary hypertension, dyslipidemia, and prostatism. Current Outpatient Medications   Medication Sig Dispense Refill    finasteride (PROSCAR) 5 mg tablet TAKE 1 TABLET BY MOUTH EVERY DAY 30 Tab 11    simvastatin (ZOCOR) 40 mg tablet TAKE 1 TAB BY MOUTH NIGHTLY. 30 Tab 11    sildenafil, antihypertensive, (REVATIO) 20 mg tablet 3 tablets as needed 30 Tab 11    amLODIPine (NORVASC) 10 mg tablet TAKE 1 TABLET BY MOUTH EVERY DAY 90 Tab 3    FLUoxetine (PROZAC) 20 mg capsule Take 1 Cap by mouth daily. 30 Cap 11    losartan (COZAAR) 100 mg tablet TAKE 1 TAB BY MOUTH DAILY. 27 Tab 11     Past Medical History:   Diagnosis Date    Hypertension      History reviewed. No pertinent surgical history.   Allergies   Allergen Reactions    Lisinopril Swelling         REVIEW OF SYSTEMS:  General: negative for - chills or fever  ENT: negative for - headaches, nasal congestion or tinnitus  Respiratory: negative for - cough, hemoptysis, shortness of breath or wheezing  Cardiovascular : negative for - chest pain, edema, palpitations or shortness of breath  Gastrointestinal: negative for - abdominal pain, blood in stools, heartburn or nausea/vomiting  Genito-Urinary: no dysuria, trouble voiding, or hematuria  Musculoskeletal: negative for - gait disturbance, joint pain, joint stiffness or joint swelling  Neurological: no TIA or stroke symptoms  Hematologic: no bruises, no bleeding, no swollen glands  Integument: no lumps, mole changes, nail changes or rash  Endocrine: no malaise/lethargy or unexpected weight changes      Social History     Socioeconomic History    Marital status:      Spouse name: Not on file    Number of children: 2    Years of education: Not on file    Highest education level: Not on file   Occupational History    Occupation: Retired teacher   Tobacco Use    Smoking status: Never Smoker    Smokeless tobacco: Never Used   Substance and Sexual Activity    Alcohol use: Yes    Drug use: No    Sexual activity: Yes     Partners: Female     History reviewed. No pertinent family history. OBJECTIVE:    Visit Vitals  /73   Pulse (!) 59   Temp 97.8 °F (36.6 °C) (Oral)   Resp 16   Ht 5' 11\" (1.803 m)   Wt 151 lb 9.6 oz (68.8 kg)   SpO2 99%   BMI 21.14 kg/m²     CONSTITUTIONAL: well , well nourished, appears age appropriate  EYES: perrla, eom intact  ENMT:moist mucous membranes, pharynx clear  NECK: supple. Thyroid normal  RESPIRATORY: Chest: clear to ascultation and percussion   CARDIOVASCULAR: Heart: regular rate and rhythm  GASTROINTESTINAL: Abdomen: soft, bowel sounds active  HEMATOLOGIC: no pathological lymph nodes palpated  MUSCULOSKELETAL: Extremities: no edema, pulse 1+   INTEGUMENT: No unusual rashes or suspicious skin lesions noted. Nails appear normal.  NEUROLOGIC: non-focal exam   MENTAL STATUS: alert and oriented, appropriate affect      ASSESSMENT:  1. Mild depression ([address]    2. Short-term memory loss    3. Essential hypertension    4. Dyslipidemia    5. Proteinuria, unspecified type    6. Benign prostatic hyperplasia without lower urinary tract symptoms    7.  Chronic ITP (idiopathic thrombocytopenia) ([facility] [address]        PLAN:    1. His depression persists. He will continue his Fluoxetine. He is having no adverse effects from this. 2. He still has a problem with his short term memory. Continued observation. He might have early dementia, but time will tell. 3. Blood pressure is excellent today, no adjustments are made. 4. He will continue a statin as prescribed in view of his increased cardiovascular risk. 5. He does have mild proteinuria and I will quantitate this. 6. He has ITP and is currently seeing his hematologist for this. He is taking prednisone 2.5 mg daily. .  Orders Placed This Encounter    PROTEIN,TOTAL,URINE    CREATININE, UR, RANDOM    METABOLIC PANEL, BASIC    PROSTATE SPECIFIC AG         Follow-up Disposition:  Return in about 4 months (around 7/5/2019).       Charli Bob MD

## 2019-03-26 RX ORDER — FLUOXETINE HYDROCHLORIDE 20 MG/1
CAPSULE ORAL
Qty: 30 CAP | Refills: 11 | Status: SHIPPED | OUTPATIENT
Start: 2019-03-26 | End: 2020-04-11

## 2019-06-05 DIAGNOSIS — I10 ESSENTIAL HYPERTENSION: ICD-10-CM

## 2019-06-06 RX ORDER — AMLODIPINE BESYLATE 10 MG/1
TABLET ORAL
Qty: 90 TAB | Refills: 3 | Status: SHIPPED | OUTPATIENT
Start: 2019-06-06 | End: 2020-06-01

## 2019-07-05 ENCOUNTER — OFFICE VISIT (OUTPATIENT)
Dept: INTERNAL MEDICINE CLINIC | Age: 79
End: 2019-07-05

## 2019-07-05 VITALS
BODY MASS INDEX: 20.64 KG/M2 | DIASTOLIC BLOOD PRESSURE: 81 MMHG | SYSTOLIC BLOOD PRESSURE: 136 MMHG | HEIGHT: 71 IN | TEMPERATURE: 98 F | RESPIRATION RATE: 16 BRPM | HEART RATE: 54 BPM | OXYGEN SATURATION: 98 % | WEIGHT: 147.4 LBS

## 2019-07-05 DIAGNOSIS — E78.5 DYSLIPIDEMIA: ICD-10-CM

## 2019-07-05 DIAGNOSIS — F32.A DEPRESSION, UNSPECIFIED DEPRESSION TYPE: Primary | ICD-10-CM

## 2019-07-05 DIAGNOSIS — D69.3 CHRONIC ITP (IDIOPATHIC THROMBOCYTOPENIA) (HCC): ICD-10-CM

## 2019-07-05 DIAGNOSIS — I10 ESSENTIAL HYPERTENSION: ICD-10-CM

## 2019-07-05 DIAGNOSIS — N40.0 PROSTATISM: ICD-10-CM

## 2019-07-05 NOTE — PROGRESS NOTES
Chief Complaint   Patient presents with    Hypertension     4 month follow up      1. Have you been to the ER, urgent care clinic since your last visit? Hospitalized since your last visit? No    2. Have you seen or consulted any other health care providers outside of the [address] [place] [address] since your last visit? Include any pap smears or colon screening.  No

## 2019-07-06 LAB
ALBUMIN SERPL-MCNC: 4.5 G/DL (ref 3.5–4.8)
ALBUMIN/GLOB SERPL: 1.4 {RATIO} (ref 1.2–2.2)
ALP SERPL-CCNC: 53 IU/L (ref 39–117)
ALT SERPL-CCNC: 23 IU/L (ref 0–44)
APO B SERPL-MCNC: 66 MG/DL
APPEARANCE UR: CLEAR
AST SERPL-CCNC: 24 IU/L (ref 0–40)
BACTERIA #/AREA URNS HPF: NORMAL /[HPF]
BILIRUB SERPL-MCNC: 0.5 MG/DL (ref 0–1.2)
BILIRUB UR QL STRIP: NEGATIVE
BUN SERPL-MCNC: 16 MG/DL (ref 8–27)
BUN/CREAT SERPL: 12 (ref 10–24)
CALCIUM SERPL-MCNC: 9.7 MG/DL (ref 8.6–10.2)
CASTS URNS QL MICRO: NORMAL /LPF
CHLORIDE SERPL-SCNC: 102 MMOL/L (ref 96–106)
CHOLEST SERPL-MCNC: 158 MG/DL (ref 100–199)
CO2 SERPL-SCNC: 25 MMOL/L (ref 20–29)
COLOR UR: YELLOW
CREAT SERPL-MCNC: 1.38 MG/DL (ref 0.76–1.27)
EPI CELLS #/AREA URNS HPF: NORMAL /HPF (ref 0–10)
GLOBULIN SER CALC-MCNC: 3.2 G/DL (ref 1.5–4.5)
GLUCOSE SERPL-MCNC: 87 MG/DL (ref 65–99)
GLUCOSE UR QL: NEGATIVE
HDLC SERPL-MCNC: 85 MG/DL
HGB UR QL STRIP: NEGATIVE
KETONES UR QL STRIP: NEGATIVE
LDLC SERPL CALC-MCNC: 62 MG/DL (ref 0–99)
LEUKOCYTE ESTERASE UR QL STRIP: NEGATIVE
MICRO URNS: ABNORMAL
MUCOUS THREADS URNS QL MICRO: PRESENT
NITRITE UR QL STRIP: NEGATIVE
PH UR STRIP: 7 [PH] (ref 5–7.5)
POTASSIUM SERPL-SCNC: 4.7 MMOL/L (ref 3.5–5.2)
PROT SERPL-MCNC: 7.7 G/DL (ref 6–8.5)
PROT UR QL STRIP: ABNORMAL
RBC #/AREA URNS HPF: NORMAL /HPF (ref 0–2)
SODIUM SERPL-SCNC: 141 MMOL/L (ref 134–144)
SP GR UR: 1.01 (ref 1–1.03)
TRIGL SERPL-MCNC: 54 MG/DL (ref 0–149)
UROBILINOGEN UR STRIP-MCNC: 0.2 MG/DL (ref 0.2–1)
VLDLC SERPL CALC-MCNC: 11 MG/DL (ref 5–40)
WBC #/AREA URNS HPF: NORMAL /HPF (ref 0–5)

## 2019-07-06 NOTE — PROGRESS NOTES
[facility]  [place] [address]  [address] [place] [id number]  Phone:  [phone]  Fax: [phone]       Chief Complaint   Patient presents with    Hypertension     4 month follow up    . SUBJECTIVE:    Francois Tucker is a 66 y.o. male Comes in for a return visit stating that he has done reasonably well. He states his depression continues to improve. He is able to do things on his own. There is obviously no one here to corroborate that. He comes to the office today by himself as was the case on his last 2 visits. He states that his wife most recently had a left total hip replacement and he is assisting with her convalescence. He saw a hematologist who discontinued his prednisone totally. Follow-up is in 6 months. He has a past history of primary hypertension and dyslipidemia as well as prostatism. Current Outpatient Medications   Medication Sig Dispense Refill    amLODIPine (NORVASC) 10 mg tablet TAKE 1 TABLET BY MOUTH EVERY DAY 90 Tab 3    losartan (COZAAR) 100 mg tablet TAKE 1 TABLET BY MOUTH EVERY DAY 30 Tab 11    FLUoxetine (PROZAC) 20 mg capsule TAKE ONE CAPSULE BY MOUTH EVERY DAY 30 Cap 11    finasteride (PROSCAR) 5 mg tablet TAKE 1 TABLET BY MOUTH EVERY DAY 30 Tab 11    simvastatin (ZOCOR) 40 mg tablet TAKE 1 TAB BY MOUTH NIGHTLY. 30 Tab 11    sildenafil, antihypertensive, (REVATIO) 20 mg tablet 3 tablets as needed 30 Tab 11     Past Medical History:   Diagnosis Date    Hypertension      History reviewed. No pertinent surgical history.   Allergies   Allergen Reactions    Lisinopril Swelling         REVIEW OF SYSTEMS:  General: negative for - chills or fever  ENT: negative for - headaches, nasal congestion or tinnitus  Respiratory: negative for - cough, hemoptysis, shortness of breath or wheezing  Cardiovascular : negative for - chest pain, edema, palpitations or shortness of breath  Gastrointestinal: negative for - abdominal pain, blood in stools, heartburn or nausea/vomiting  Genito-Urinary: no dysuria, trouble voiding, or hematuria  Musculoskeletal: negative for - gait disturbance, joint pain, joint stiffness or joint swelling  Neurological: no TIA or stroke symptoms  Hematologic: no bruises, no bleeding, no swollen glands  Integument: no lumps, mole changes, nail changes or rash  Endocrine: no malaise/lethargy or unexpected weight changes      Social History     Socioeconomic History    Marital status:      Spouse name: Not on file    Number of children: 2    Years of education: Not on file    Highest education level: Not on file   Occupational History    Occupation: Retired teacher   Tobacco Use    Smoking status: Never Smoker    Smokeless tobacco: Never Used   Substance and Sexual Activity    Alcohol use: Yes    Drug use: No    Sexual activity: Yes     Partners: Female     History reviewed. No pertinent family history. OBJECTIVE:    Visit Vitals  /81   Pulse (!) 54   Temp 98 °F (36.7 °C) (Oral)   Resp 16   Ht 5' 11\" (1.803 m)   Wt 147 lb 6.4 oz (66.9 kg)   SpO2 98%   BMI 20.56 kg/m²     CONSTITUTIONAL: well , well nourished, appears age appropriate  EYES: perrla, eom intact  ENMT:moist mucous membranes, pharynx clear  NECK: supple. Thyroid normal  RESPIRATORY: Chest: clear to ascultation and percussion   CARDIOVASCULAR: Heart: regular rate and rhythm  GASTROINTESTINAL: Abdomen: soft, bowel sounds active  HEMATOLOGIC: no pathological lymph nodes palpated  MUSCULOSKELETAL: Extremities: no edema, pulse 1+   INTEGUMENT: No unusual rashes or suspicious skin lesions noted. Nails appear normal.  NEUROLOGIC: non-focal exam   MENTAL STATUS: alert and oriented, appropriate affect      ASSESSMENT:  1. Depression, unspecified depression type    2. Essential hypertension    3. Dyslipidemia    4. Chronic ITP (idiopathic thrombocytopenia) (HCC)    5. Prostatism        PLAN:  1. His depression appears to be doing well. He will continue his fluoxetine. He is tolerating this quite well with no adverse effects. 2. He will follow-up with his hematologist for his ITP, which fortunately is stable. 3. Blood pressure is excellent today. No adjustments are made. 4. He will continue statin as prescribed. 5. He currently has no obstructive urinary symptoms. His last PSA was excellent. He will therefore continue his finasteride as prescribed. .  Orders Placed This Encounter    APOLIPOPROTEIN B    LIPID PANEL    METABOLIC PANEL, COMPREHENSIVE    URINALYSIS W/ RFLX MICROSCOPIC    MICROSCOPIC EXAMINATION         Follow-up and Dispositions    · Return in about 6 months (around 1/5/2020).            Flavio Leigh MD

## 2019-11-19 RX ORDER — SILDENAFIL CITRATE 20 MG/1
TABLET ORAL
Qty: 90 TAB | Refills: 3 | Status: SHIPPED | OUTPATIENT
Start: 2019-11-19

## 2020-01-10 ENCOUNTER — OFFICE VISIT (OUTPATIENT)
Dept: INTERNAL MEDICINE CLINIC | Age: 80
End: 2020-01-10

## 2020-01-10 VITALS
TEMPERATURE: 97.8 F | HEIGHT: 71 IN | HEART RATE: 51 BPM | WEIGHT: 141.8 LBS | BODY MASS INDEX: 19.85 KG/M2 | RESPIRATION RATE: 14 BRPM | SYSTOLIC BLOOD PRESSURE: 122 MMHG | OXYGEN SATURATION: 98 % | DIASTOLIC BLOOD PRESSURE: 74 MMHG

## 2020-01-10 DIAGNOSIS — F32.A DEPRESSION, UNSPECIFIED DEPRESSION TYPE: Primary | ICD-10-CM

## 2020-01-10 DIAGNOSIS — R80.9 PROTEINURIA, UNSPECIFIED TYPE: ICD-10-CM

## 2020-01-10 DIAGNOSIS — I10 ESSENTIAL HYPERTENSION: ICD-10-CM

## 2020-01-10 DIAGNOSIS — Z13.39 SCREENING FOR ALCOHOLISM: ICD-10-CM

## 2020-01-10 DIAGNOSIS — D69.3 CHRONIC ITP (IDIOPATHIC THROMBOCYTOPENIA) (HCC): ICD-10-CM

## 2020-01-10 DIAGNOSIS — Z00.00 WELLNESS EXAMINATION: ICD-10-CM

## 2020-01-10 DIAGNOSIS — Z13.31 SCREENING FOR DEPRESSION: ICD-10-CM

## 2020-01-10 DIAGNOSIS — E78.5 DYSLIPIDEMIA: ICD-10-CM

## 2020-01-10 NOTE — PROGRESS NOTES
[address] [facility]  [facility]  [address] [place] [address]  Phone:  [phone]  Fax: [phone]       Chief Complaint   Patient presents with   [facility] Wellness Visit   . SUBJECTIVE:    Selam Vail is a 78 y.o. male Comes in for return visit stating that he is doing reasonably well. He feels his short term memory is almost back to normal.  This was impaired because of severe depression created by the unexpected death of his daughter. He is now doing everything that he was doing before without any limitations. Unfortunately, his wife fell and fractured her left arm and he has had to assist with her overall care. He did have a history of ITP, but this resolved and he is on no medication for this, as has been the case for well over six months to a year. There is a past history of primary hypertension and dyslipidemia, as well as prostatism. Current Outpatient Medications   Medication Sig Dispense Refill    sildenafil, antihypertensive, (REVATIO) 20 mg tablet TAKE 3 TABLETS BY MOUTH DAILY AS NEEDED *NOT COVERED* 90 Tab 3    amLODIPine (NORVASC) 10 mg tablet TAKE 1 TABLET BY MOUTH EVERY DAY 90 Tab 3    losartan (COZAAR) 100 mg tablet TAKE 1 TABLET BY MOUTH EVERY DAY 30 Tab 11    FLUoxetine (PROZAC) 20 mg capsule TAKE ONE CAPSULE BY MOUTH EVERY DAY 30 Cap 11    finasteride (PROSCAR) 5 mg tablet TAKE 1 TABLET BY MOUTH EVERY DAY 30 Tab 11    simvastatin (ZOCOR) 40 mg tablet TAKE 1 TAB BY MOUTH NIGHTLY. 27 Tab 11     Past Medical History:   Diagnosis Date    Hypertension      History reviewed. No pertinent surgical history.   Allergies   Allergen Reactions    Lisinopril Swelling         REVIEW OF SYSTEMS:  General: negative for - chills or fever  ENT: negative for - headaches, nasal congestion or tinnitus  Respiratory: negative for - cough, hemoptysis, shortness of breath or wheezing  Cardiovascular : negative for - chest pain, edema, palpitations or shortness of breath  Gastrointestinal: negative for - abdominal pain, blood in stools, heartburn or nausea/vomiting  Genito-Urinary: no dysuria, trouble voiding, or hematuria  Musculoskeletal: negative for - gait disturbance, joint pain, joint stiffness or joint swelling  Neurological: no TIA or stroke symptoms  Hematologic: no bruises, no bleeding, no swollen glands  Integument: no lumps, mole changes, nail changes or rash  Endocrine: no malaise/lethargy or unexpected weight changes      Social History     Socioeconomic History    Marital status:      Spouse name: Not on file    Number of children: 2    Years of education: Not on file    Highest education level: Not on file   Occupational History    Occupation: Retired teacher   Tobacco Use    Smoking status: Never Smoker    Smokeless tobacco: Never Used   Substance and Sexual Activity    Alcohol use: Yes    Drug use: No    Sexual activity: Yes     Partners: Female     History reviewed. No pertinent family history. OBJECTIVE:    Visit Vitals  /74   Pulse (!) 51   Temp 97.8 °F (36.6 °C) (Oral)   Resp 14   Ht 5' 11\" (1.803 m)   Wt 141 lb 12.8 oz (64.3 kg)   SpO2 98%   BMI 19.78 kg/m²     CONSTITUTIONAL: well , well nourished, appears age appropriate  EYES: perrla, eom intact  ENMT:moist mucous membranes, pharynx clear  NECK: supple. Thyroid normal  RESPIRATORY: Chest: clear to ascultation and percussion   CARDIOVASCULAR: Heart: regular rate and rhythm  GASTROINTESTINAL: Abdomen: soft, bowel sounds active  HEMATOLOGIC: no pathological lymph nodes palpated  MUSCULOSKELETAL: Extremities: no edema, pulse 1+   INTEGUMENT: No unusual rashes or suspicious skin lesions noted. Nails appear normal.  NEUROLOGIC: non-focal exam   MENTAL STATUS: alert and oriented, appropriate affect      ASSESSMENT:  1. Depression, unspecified depression type    2. Essential hypertension    3. Chronic ITP (idiopathic thrombocytopenia) (HCC)    4. Dyslipidemia    5. Proteinuria, unspecified type    6. Screening for alcoholism    7. Screening for depression    8. Wellness examination        PLAN:    1. His depression appears to be doing quite well. I congratulate him on this. 2. CBC will be checked in view of his history of ITP. 3. His blood pressure is excellent, no adjustments are made. 4. He will continue statin in view of his primary cardiovascular risk prevention. 5. I encouraged him to increase his physical activity on a more consistent basis. He needs to walk on a daily basis. Follow-up and Dispositions    · Return in about 4 months (around 5/10/2020). Danny Springer MD  This is the Subsequent Medicare Annual Wellness Exam, performed 12 months or more after the Initial AWV or the last Subsequent AWV    I have reviewed the patient's medical history in detail and updated the computerized patient record. History     Patient Active Problem List   Diagnosis Code    Hypertension I10    Prostatism N40.0    Dyslipidemia E78.5    Dermatitis L30.9    Proteinuria R80.9    Depression F32.9    Chronic ITP (idiopathic thrombocytopenia) ([facility]) D69.3     Past Medical History:   Diagnosis Date    Hypertension       History reviewed. No pertinent surgical history. Current Outpatient Medications   Medication Sig Dispense Refill    sildenafil, antihypertensive, (REVATIO) 20 mg tablet TAKE 3 TABLETS BY MOUTH DAILY AS NEEDED *NOT COVERED* 90 Tab 3    amLODIPine (NORVASC) 10 mg tablet TAKE 1 TABLET BY MOUTH EVERY DAY 90 Tab 3    losartan (COZAAR) 100 mg tablet TAKE 1 TABLET BY MOUTH EVERY DAY 30 Tab 11    FLUoxetine (PROZAC) 20 mg capsule TAKE ONE CAPSULE BY MOUTH EVERY DAY 30 Cap 11    finasteride (PROSCAR) 5 mg tablet TAKE 1 TABLET BY MOUTH EVERY DAY 30 Tab 11    simvastatin (ZOCOR) 40 mg tablet TAKE 1 TAB BY MOUTH NIGHTLY. 30 Tab 11     Allergies   Allergen Reactions    Lisinopril Swelling       History reviewed.  No pertinent family history. Social History     Tobacco Use    Smoking status: Never Smoker    Smokeless tobacco: Never Used   Substance Use Topics    Alcohol use: Yes       Depression Risk Factor Screening:     3 most recent PHQ Screens 4/10/2018   PHQ Not Done -   Little interest or pleasure in doing things Not at all   Feeling down, depressed, irritable, or hopeless Not at all   Total Score PHQ 2 0       Alcohol Risk Factor Screening (MALE > 65): Do you average more 1 drink per night or more than 7 drinks a week: No    In the past three months have you have had more than 4 drinks containing alcohol on one occasion: No      Functional Ability and Level of Safety:   Hearing: Hearing is good. Activities of Daily Living: The home contains: no safety equipment. Patient does total self care    Ambulation: with no difficulty    Fall Risk:  Fall Risk Assessment, last 12 mths 1/10/2020   Able to walk? Yes   Fall in past 12 months? No       Abuse Screen:  Patient is not abused    Cognitive Screening   Has your family/caregiver stated any concerns about your memory: no  Cognitive Screening: Normal - MMSE (Mini Mental Status Exam)    Patient Care Team   Patient Care Team:  Evie Wilde MD as PCP - General (Internal Medicine)  Evie Wilde MD as PCP - REHABILITATION [facility]    Assessment/Plan   Education and counseling provided:  Are appropriate based on today's review and evaluation    Diagnoses and all orders for this visit:    1. Depression, unspecified depression type    2. Essential hypertension  -     METABOLIC PANEL, BASIC    3. Chronic ITP (idiopathic thrombocytopenia) (HCC)  -     CBC WITH AUTOMATED DIFF    4. Dyslipidemia  -     APOLIPOPROTEIN B    5. Proteinuria, unspecified type    6. Screening for alcoholism  -     [place] ANNUAL ALCOHOL SCREEN 15 MIN    7. Screening for depression  -     DEPRESSION SCREEN ANNUAL    8.  Wellness examination        Health Maintenance Due   Topic Date Due    Shingrix Vaccine Age 50> (1 of 2) 09/23/1990    Pneumococcal 65+ years (1 of 1 - PPSV23) 09/23/2005    GLAUCOMA SCREENING Q2Y  10/01/2017    Influenza Age 9 to Adult  08/01/2019

## 2020-01-10 NOTE — PROGRESS NOTES
Chief Complaint   Patient presents with   [facility] Annual Wellness Visit     1. Have you been to the ER, urgent care clinic since your last visit? Hospitalized since your last visit? Yes When: about 1 week ago Where: [facility] Reason for visit: fatigue    2. Have you seen or consulted any other health care providers outside of the [address] [place] [address] since your last visit? Include any pap smears or colon screening.  No

## 2020-01-11 LAB
APO B SERPL-MCNC: 59 MG/DL
BASOPHILS # BLD AUTO: 0 X10E3/UL (ref 0–0.2)
BASOPHILS NFR BLD AUTO: 1 %
BUN SERPL-MCNC: 23 MG/DL (ref 8–27)
BUN/CREAT SERPL: 18 (ref 10–24)
CALCIUM SERPL-MCNC: 9.3 MG/DL (ref 8.6–10.2)
CHLORIDE SERPL-SCNC: 101 MMOL/L (ref 96–106)
CO2 SERPL-SCNC: 24 MMOL/L (ref 20–29)
CREAT SERPL-MCNC: 1.26 MG/DL (ref 0.76–1.27)
EOSINOPHIL # BLD AUTO: 0.1 X10E3/UL (ref 0–0.4)
EOSINOPHIL NFR BLD AUTO: 2 %
ERYTHROCYTE [DISTWIDTH] IN BLOOD BY AUTOMATED COUNT: 12.7 % (ref 11.6–15.4)
GLUCOSE SERPL-MCNC: 83 MG/DL (ref 65–99)
HCT VFR BLD AUTO: 39.5 % (ref 37.5–51)
HGB BLD-MCNC: 13 G/DL (ref 13–17.7)
IMM GRANULOCYTES # BLD AUTO: 0 X10E3/UL (ref 0–0.1)
IMM GRANULOCYTES NFR BLD AUTO: 0 %
LYMPHOCYTES # BLD AUTO: 1.8 X10E3/UL (ref 0.7–3.1)
LYMPHOCYTES NFR BLD AUTO: 60 %
MCH RBC QN AUTO: 29.7 PG (ref 26.6–33)
MCHC RBC AUTO-ENTMCNC: 32.9 G/DL (ref 31.5–35.7)
MCV RBC AUTO: 90 FL (ref 79–97)
MONOCYTES # BLD AUTO: 0.4 X10E3/UL (ref 0.1–0.9)
MONOCYTES NFR BLD AUTO: 14 %
MORPHOLOGY BLD-IMP: ABNORMAL
NEUTROPHILS # BLD AUTO: 0.7 X10E3/UL (ref 1.4–7)
NEUTROPHILS NFR BLD AUTO: 23 %
PLATELET # BLD AUTO: 147 X10E3/UL (ref 150–450)
POTASSIUM SERPL-SCNC: 4.9 MMOL/L (ref 3.5–5.2)
RBC # BLD AUTO: 4.37 X10E6/UL (ref 4.14–5.8)
SODIUM SERPL-SCNC: 140 MMOL/L (ref 134–144)
WBC # BLD AUTO: 3.1 X10E3/UL (ref 3.4–10.8)

## 2020-01-11 NOTE — PATIENT INSTRUCTIONS
Medicare Wellness Visit, Male    The best way to live healthy is to have a lifestyle where you eat a well-balanced diet, exercise regularly, limit alcohol use, and quit all forms of tobacco/nicotine, if applicable. Regular preventive services are another way to keep healthy. Preventive services (vaccines, screening tests, monitoring & exams) can help personalize your care plan, which helps you manage your own care. Screening tests can find health problems at the earliest stages, when they are easiest to treat. [facility] follows the current, evidence-based guidelines published by the [facility] ([facility]) when recommending preventive services for our patients. Because we follow these guidelines, sometimes recommendations change over time as research supports it. (For example, a prostate screening blood test is no longer routinely recommended for men with no symptoms). Of course, you and your doctor may decide to screen more often for some diseases, based on your risk and co-morbidities (chronic disease you are already diagnosed with). Preventive services for you include:  - Medicare offers their members a free annual wellness visit, which is time for you and your primary care provider to discuss and plan for your preventive service needs. Take advantage of this benefit every year!  -All adults over age 72 should receive the recommended pneumonia vaccines. Current USPSTF guidelines recommend a series of two vaccines for the best pneumonia protection.   -All adults should have a flu vaccine yearly and tetanus vaccine every 10 years.  -All adults age 48 and older should receive the shingles vaccines (series of two vaccines).        -All adults age 38-68 who are overweight should have a diabetes screening test once every three years.   -Other screening tests & preventive services for persons with diabetes include: an eye exam to screen for diabetic retinopathy, a kidney function test, a foot exam, and stricter control over your cholesterol.   -Cardiovascular screening for adults with routine risk involves an electrocardiogram (ECG) at intervals determined by the provider.   -Colorectal cancer screening should be done for adults age 54-65 with no increased risk factors for colorectal cancer. There are a number of acceptable methods of screening for this type of cancer. Each test has its own benefits and drawbacks. Discuss with your provider what is most appropriate for you during your annual wellness visit. The different tests include: colonoscopy (considered the best screening method), a fecal occult blood test, a fecal DNA test, and sigmoidoscopy.  -All adults born between [facility] should be screened once for Hepatitis C.  -An Abdominal Aortic Aneurysm (AAA) Screening is recommended for men age 73-68 who has ever smoked in their lifetime.      Here is a list of your current Health Maintenance items (your personalized list of preventive services) with a due date:  Health Maintenance Due   Topic Date Due    Shingles Vaccine (1 of 2) 09/23/1990    Pneumococcal Vaccine (1 of 1 - PPSV23) 09/23/2005    Glaucoma Screening   10/01/2017    Flu Vaccine  08/01/2019

## 2020-02-09 DIAGNOSIS — N40.0 PROSTATISM: ICD-10-CM

## 2020-02-09 RX ORDER — FINASTERIDE 5 MG/1
TABLET, FILM COATED ORAL
Qty: 30 TAB | Refills: 11 | Status: SHIPPED | OUTPATIENT
Start: 2020-02-09 | End: 2021-01-05

## 2020-02-09 RX ORDER — SIMVASTATIN 40 MG/1
40 TABLET, FILM COATED ORAL
Qty: 30 TAB | Refills: 11 | Status: SHIPPED | OUTPATIENT
Start: 2020-02-09 | End: 2021-01-05

## 2020-03-31 RX ORDER — TELMISARTAN 80 MG/1
80 TABLET ORAL DAILY
Qty: 30 TAB | Refills: 11 | Status: SHIPPED | OUTPATIENT
Start: 2020-03-31 | End: 2021-04-05

## 2020-04-11 RX ORDER — FLUOXETINE HYDROCHLORIDE 20 MG/1
CAPSULE ORAL
Qty: 30 CAP | Refills: 11 | Status: SHIPPED | OUTPATIENT
Start: 2020-04-11 | End: 2020-10-08 | Stop reason: SDUPTHER

## 2020-06-24 DIAGNOSIS — I10 ESSENTIAL HYPERTENSION: ICD-10-CM

## 2020-06-24 RX ORDER — AMLODIPINE BESYLATE 10 MG/1
TABLET ORAL
Qty: 30 TAB | Refills: 0 | Status: SHIPPED | OUTPATIENT
Start: 2020-06-24 | End: 2020-07-21

## 2020-07-21 DIAGNOSIS — I10 ESSENTIAL HYPERTENSION: ICD-10-CM

## 2020-07-21 RX ORDER — AMLODIPINE BESYLATE 10 MG/1
TABLET ORAL
Qty: 30 TAB | Refills: 0 | Status: SHIPPED | OUTPATIENT
Start: 2020-07-21 | End: 2020-09-07

## 2020-09-07 DIAGNOSIS — I10 ESSENTIAL HYPERTENSION: ICD-10-CM

## 2020-09-07 RX ORDER — AMLODIPINE BESYLATE 10 MG/1
TABLET ORAL
Qty: 30 TAB | Refills: 0 | Status: SHIPPED | OUTPATIENT
Start: 2020-09-07 | End: 2020-10-01

## 2020-10-01 DIAGNOSIS — I10 ESSENTIAL HYPERTENSION: ICD-10-CM

## 2020-10-01 RX ORDER — AMLODIPINE BESYLATE 10 MG/1
TABLET ORAL
Qty: 30 TAB | Refills: 0 | Status: SHIPPED | OUTPATIENT
Start: 2020-10-01 | End: 2020-10-26

## 2020-10-08 RX ORDER — FLUOXETINE HYDROCHLORIDE 20 MG/1
CAPSULE ORAL
Qty: 30 CAP | Refills: 11 | Status: SHIPPED | OUTPATIENT
Start: 2020-10-08 | End: 2021-10-27

## 2020-10-26 DIAGNOSIS — I10 ESSENTIAL HYPERTENSION: ICD-10-CM

## 2020-10-26 RX ORDER — AMLODIPINE BESYLATE 10 MG/1
TABLET ORAL
Qty: 30 TAB | Refills: 0 | Status: SHIPPED | OUTPATIENT
Start: 2020-10-26 | End: 2020-11-18

## 2020-11-18 DIAGNOSIS — I10 ESSENTIAL HYPERTENSION: ICD-10-CM

## 2020-11-18 RX ORDER — AMLODIPINE BESYLATE 10 MG/1
TABLET ORAL
Qty: 30 TAB | Refills: 0 | Status: SHIPPED | OUTPATIENT
Start: 2020-11-18 | End: 2020-12-23

## 2020-12-23 DIAGNOSIS — I10 ESSENTIAL HYPERTENSION: ICD-10-CM

## 2020-12-23 RX ORDER — AMLODIPINE BESYLATE 10 MG/1
TABLET ORAL
Qty: 30 TAB | Refills: 0 | Status: SHIPPED | OUTPATIENT
Start: 2020-12-23 | End: 2021-01-25

## 2021-01-05 DIAGNOSIS — N40.0 PROSTATISM: ICD-10-CM

## 2021-01-05 RX ORDER — SIMVASTATIN 40 MG/1
TABLET, FILM COATED ORAL
Qty: 90 TAB | Refills: 3 | Status: SHIPPED | OUTPATIENT
Start: 2021-01-05 | End: 2022-05-10 | Stop reason: SDUPTHER

## 2021-01-05 RX ORDER — FINASTERIDE 5 MG/1
TABLET, FILM COATED ORAL
Qty: 90 TAB | Refills: 3 | Status: SHIPPED | OUTPATIENT
Start: 2021-01-05 | End: 2022-01-27 | Stop reason: SDUPTHER

## 2021-01-06 ENCOUNTER — OFFICE VISIT (OUTPATIENT)
Dept: INTERNAL MEDICINE CLINIC | Age: 81
End: 2021-01-06
Payer: MEDICARE

## 2021-01-06 VITALS
RESPIRATION RATE: 14 BRPM | TEMPERATURE: 98.6 F | HEART RATE: 55 BPM | OXYGEN SATURATION: 97 % | DIASTOLIC BLOOD PRESSURE: 70 MMHG | SYSTOLIC BLOOD PRESSURE: 134 MMHG | BODY MASS INDEX: 20.1 KG/M2 | WEIGHT: 143.6 LBS | HEIGHT: 71 IN

## 2021-01-06 DIAGNOSIS — N40.0 PROSTATISM: ICD-10-CM

## 2021-01-06 DIAGNOSIS — E78.5 DYSLIPIDEMIA: ICD-10-CM

## 2021-01-06 DIAGNOSIS — I10 ESSENTIAL HYPERTENSION: Primary | ICD-10-CM

## 2021-01-06 DIAGNOSIS — D69.3 CHRONIC ITP (IDIOPATHIC THROMBOCYTOPENIA) (HCC): ICD-10-CM

## 2021-01-06 DIAGNOSIS — F32.A DEPRESSION, UNSPECIFIED DEPRESSION TYPE: ICD-10-CM

## 2021-01-06 DIAGNOSIS — R80.9 PROTEINURIA, UNSPECIFIED TYPE: ICD-10-CM

## 2021-01-06 PROCEDURE — 99215 OFFICE O/P EST HI 40 MIN: CPT | Performed by: INTERNAL MEDICINE

## 2021-01-06 NOTE — PROGRESS NOTES
1. Have you been to the ER, urgent care clinic since your last visit? Hospitalized since your last visit? No    2. Have you seen or consulted any other health care providers outside of the [address] [place] [address] since your last visit? Include any pap smears or colon screening.  No

## 2021-01-07 LAB
ANION GAP SERPL CALC-SCNC: 3 MMOL/L (ref 5–15)
BASOPHILS # BLD: 0 K/UL (ref 0–0.1)
BASOPHILS NFR BLD: 1 % (ref 0–1)
BUN SERPL-MCNC: 18 MG/DL (ref 6–20)
BUN/CREAT SERPL: 13 (ref 12–20)
CALCIUM SERPL-MCNC: 9.5 MG/DL (ref 8.5–10.1)
CHLORIDE SERPL-SCNC: 104 MMOL/L (ref 97–108)
CO2 SERPL-SCNC: 30 MMOL/L (ref 21–32)
CREAT SERPL-MCNC: 1.36 MG/DL (ref 0.7–1.3)
CREAT UR-MCNC: 105 MG/DL
DIFFERENTIAL METHOD BLD: ABNORMAL
EOSINOPHIL # BLD: 0 K/UL (ref 0–0.4)
EOSINOPHIL NFR BLD: 1 % (ref 0–7)
ERYTHROCYTE [DISTWIDTH] IN BLOOD BY AUTOMATED COUNT: 13.7 % (ref 11.5–14.5)
GLUCOSE SERPL-MCNC: 78 MG/DL (ref 65–100)
HCT VFR BLD AUTO: 43.3 % (ref 36.6–50.3)
HGB BLD-MCNC: 13.5 G/DL (ref 12.1–17)
IMM GRANULOCYTES # BLD AUTO: 0 K/UL (ref 0–0.04)
IMM GRANULOCYTES NFR BLD AUTO: 0 % (ref 0–0.5)
LYMPHOCYTES # BLD: 1.5 K/UL (ref 0.8–3.5)
LYMPHOCYTES NFR BLD: 49 % (ref 12–49)
MCH RBC QN AUTO: 29.5 PG (ref 26–34)
MCHC RBC AUTO-ENTMCNC: 31.2 G/DL (ref 30–36.5)
MCV RBC AUTO: 94.7 FL (ref 80–99)
MONOCYTES # BLD: 0.4 K/UL (ref 0–1)
MONOCYTES NFR BLD: 13 % (ref 5–13)
NEUTS SEG # BLD: 1.2 K/UL (ref 1.8–8)
NEUTS SEG NFR BLD: 36 % (ref 32–75)
NRBC # BLD: 0 K/UL (ref 0–0.01)
NRBC BLD-RTO: 0 PER 100 WBC
PLATELET # BLD AUTO: 164 K/UL (ref 150–400)
PMV BLD AUTO: 11.7 FL (ref 8.9–12.9)
POTASSIUM SERPL-SCNC: 4.5 MMOL/L (ref 3.5–5.1)
PROT UR-MCNC: 35 MG/DL (ref 0–11.9)
PSA SERPL-MCNC: 1.8 NG/ML (ref 0.01–4)
RBC # BLD AUTO: 4.57 M/UL (ref 4.1–5.7)
SODIUM SERPL-SCNC: 137 MMOL/L (ref 136–145)
WBC # BLD AUTO: 3.2 K/UL (ref 4.1–11.1)

## 2021-01-17 NOTE — PROGRESS NOTES
[facility]  [place] [address]  [address] [place] [address]  Phone:  [phone]  Fax: [phone]       Chief Complaint   Patient presents with    Hypertension   . SUBJECTIVE:    Ky Manning is a [de-identified] y.o. male Comes in for return visit stating that he is doing reasonably well. Unfortunately, his wife is a bit ill currently. He is caring for her. He states his memory is doing remarkably well. He has had no major problems with his activities of daily living or driving. His mood is quite upbeat and he is taking Fluoxetine on a regular basis with no adverse effects. He has a past history of primary hypertension, dyslipidemia, thrombocytopenia and prostatism. The thrombocytopenia was related to ITP. Current Outpatient Medications   Medication Sig Dispense Refill    finasteride (PROSCAR) 5 mg tablet TAKE 1 TABLET BY MOUTH EVERY DAY 90 Tab 3    simvastatin (ZOCOR) 40 mg tablet TAKE 1 TABLET BY MOUTH EVERY DAY AT NIGHT 90 Tab 3    amLODIPine (NORVASC) 10 mg tablet TAKE 1 TABLET BY MOUTH EVERY DAY 30 Tab 0    FLUoxetine (PROzac) 20 mg capsule TAKE ONE CAPSULE BY MOUTH EVERY DAY 30 Cap 11    telmisartan (MICARDIS) 80 mg tablet Take 1 Tab by mouth daily. Indications: high blood pressure 30 Tab 11    sildenafil, antihypertensive, (REVATIO) 20 mg tablet TAKE 3 TABLETS BY MOUTH DAILY AS NEEDED *NOT COVERED* 90 Tab 3    losartan (COZAAR) 100 mg tablet TAKE 1 TABLET BY MOUTH EVERY DAY 30 Tab 11     Past Medical History:   Diagnosis Date    Hypertension      History reviewed. No pertinent surgical history.   Allergies   Allergen Reactions    Lisinopril Swelling         REVIEW OF SYSTEMS:  General: negative for - chills or fever  ENT: negative for - headaches, nasal congestion or tinnitus  Respiratory: negative for - cough, hemoptysis, shortness of breath or wheezing  Cardiovascular : negative for - chest pain, edema, palpitations or shortness of breath  Gastrointestinal: negative for - abdominal pain, blood in stools, heartburn or nausea/vomiting  Genito-Urinary: no dysuria, trouble voiding, or hematuria  Musculoskeletal: negative for - gait disturbance, joint pain, joint stiffness or joint swelling  Neurological: no TIA or stroke symptoms  Hematologic: no bruises, no bleeding, no swollen glands  Integument: no lumps, mole changes, nail changes or rash  Endocrine: no malaise/lethargy or unexpected weight changes      Social History     Socioeconomic History    Marital status:      Spouse name: Not on file    Number of children: 2    Years of education: Not on file    Highest education level: Not on file   Occupational History    Occupation: Retired teacher   Tobacco Use    Smoking status: Never Smoker    Smokeless tobacco: Never Used   Substance and Sexual Activity    Alcohol use: Yes    Drug use: No    Sexual activity: Yes     Partners: Female     History reviewed. No pertinent family history. OBJECTIVE:    Visit Vitals  /70   Pulse (!) 55   Temp 98.6 °F (37 °C) (Oral)   Resp 14   Ht 5' 11\" (1.803 m)   Wt 143 lb 9.6 oz (65.1 kg)   SpO2 97%   BMI 20.03 kg/m²     CONSTITUTIONAL: well , well nourished, appears age appropriate  EYES: perrla, eom intact  ENMT:moist mucous membranes, pharynx clear  NECK: supple. Thyroid normal  RESPIRATORY: Chest: clear to ascultation and percussion   CARDIOVASCULAR: Heart: regular rate and rhythm  GASTROINTESTINAL: Abdomen: soft, bowel sounds active  HEMATOLOGIC: no pathological lymph nodes palpated  MUSCULOSKELETAL: Extremities: no edema, pulse 1+   INTEGUMENT: No unusual rashes or suspicious skin lesions noted. Nails appear normal.  NEUROLOGIC: non-focal exam   MENTAL STATUS: alert and oriented, appropriate affect      ASSESSMENT:  1. Essential hypertension    2. Chronic ITP (idiopathic thrombocytopenia) (HCC)    3. Prostatism    4. Dyslipidemia    5. Depression, unspecified depression type    6. Proteinuria, unspecified type        PLAN:    1. Blood pressure is excellent today, no adjustments are made. 2. As far as ITP is concerned, he has really not followed up with hematology because they told him everything was pretty much fine. I will, however, check a CBC today. 3. His  symptoms are minimal at this point. PSA, however, will be checked. 4. He will continue statin as prescribed in view of his increased cardiovascular risk. 5. His depression is doing extremely well and he will continue Fluoxetine. I do not plan to stop this in the near future. 6. He does have a history of proteinuria and historically this has not been progressive. I will recheck the values to ensure this remains. .  Orders Placed This Encounter    METABOLIC PANEL, BASIC    CBC WITH AUTOMATED DIFF    PROSTATE SPECIFIC AG    CREATININE, UR, RANDOM    PROTEIN URINE, RANDOM ([facility] Only)         Follow-up and Dispositions    · Return in about 6 months (around 7/6/2021).            Flory Newberry MD

## 2021-01-25 DIAGNOSIS — I10 ESSENTIAL HYPERTENSION: ICD-10-CM

## 2021-01-25 RX ORDER — AMLODIPINE BESYLATE 10 MG/1
TABLET ORAL
Qty: 30 TAB | Refills: 0 | Status: SHIPPED | OUTPATIENT
Start: 2021-01-25 | End: 2021-02-26

## 2021-02-26 DIAGNOSIS — I10 ESSENTIAL HYPERTENSION: ICD-10-CM

## 2021-02-26 RX ORDER — AMLODIPINE BESYLATE 10 MG/1
TABLET ORAL
Qty: 30 TAB | Refills: 0 | Status: SHIPPED | OUTPATIENT
Start: 2021-02-26 | End: 2021-03-27

## 2021-03-27 DIAGNOSIS — I10 ESSENTIAL HYPERTENSION: ICD-10-CM

## 2021-03-27 RX ORDER — AMLODIPINE BESYLATE 10 MG/1
TABLET ORAL
Qty: 30 TAB | Refills: 0 | Status: SHIPPED | OUTPATIENT
Start: 2021-03-27 | End: 2021-04-27

## 2021-04-05 RX ORDER — TELMISARTAN 80 MG/1
80 TABLET ORAL DAILY
Qty: 90 TAB | Refills: 3 | Status: SHIPPED | OUTPATIENT
Start: 2021-04-05 | End: 2022-05-14

## 2021-04-27 DIAGNOSIS — I10 ESSENTIAL HYPERTENSION: ICD-10-CM

## 2021-04-27 RX ORDER — AMLODIPINE BESYLATE 10 MG/1
TABLET ORAL
Qty: 30 TAB | Refills: 0 | Status: SHIPPED | OUTPATIENT
Start: 2021-04-27 | End: 2021-05-24

## 2021-05-24 DIAGNOSIS — I10 ESSENTIAL HYPERTENSION: ICD-10-CM

## 2021-05-24 RX ORDER — AMLODIPINE BESYLATE 10 MG/1
TABLET ORAL
Qty: 30 TABLET | Refills: 0 | Status: SHIPPED | OUTPATIENT
Start: 2021-05-24 | End: 2021-06-25

## 2021-06-24 DIAGNOSIS — I10 ESSENTIAL HYPERTENSION: ICD-10-CM

## 2021-06-25 RX ORDER — AMLODIPINE BESYLATE 10 MG/1
TABLET ORAL
Qty: 30 TABLET | Refills: 0 | Status: SHIPPED | OUTPATIENT
Start: 2021-06-25 | End: 2021-07-27

## 2021-07-07 ENCOUNTER — OFFICE VISIT (OUTPATIENT)
Dept: INTERNAL MEDICINE CLINIC | Age: 81
End: 2021-07-07
Payer: COMMERCIAL

## 2021-07-07 VITALS
OXYGEN SATURATION: 99 % | TEMPERATURE: 98.2 F | BODY MASS INDEX: 18.76 KG/M2 | HEIGHT: 71 IN | SYSTOLIC BLOOD PRESSURE: 128 MMHG | DIASTOLIC BLOOD PRESSURE: 71 MMHG | RESPIRATION RATE: 16 BRPM | HEART RATE: 52 BPM | WEIGHT: 134 LBS

## 2021-07-07 DIAGNOSIS — E11.9 TYPE 2 DIABETES MELLITUS WITHOUT COMPLICATION, WITH LONG-TERM CURRENT USE OF INSULIN (HCC): Primary | ICD-10-CM

## 2021-07-07 DIAGNOSIS — F32.A DEPRESSION, UNSPECIFIED DEPRESSION TYPE: ICD-10-CM

## 2021-07-07 DIAGNOSIS — N40.0 PROSTATISM: ICD-10-CM

## 2021-07-07 DIAGNOSIS — Z00.00 WELLNESS EXAMINATION: ICD-10-CM

## 2021-07-07 DIAGNOSIS — Z13.31 SCREENING FOR DEPRESSION: ICD-10-CM

## 2021-07-07 DIAGNOSIS — E78.5 DYSLIPIDEMIA: ICD-10-CM

## 2021-07-07 DIAGNOSIS — I10 ESSENTIAL HYPERTENSION: ICD-10-CM

## 2021-07-07 DIAGNOSIS — Z79.4 TYPE 2 DIABETES MELLITUS WITHOUT COMPLICATION, WITH LONG-TERM CURRENT USE OF INSULIN (HCC): Primary | ICD-10-CM

## 2021-07-07 DIAGNOSIS — D69.3 CHRONIC ITP (IDIOPATHIC THROMBOCYTOPENIA) (HCC): ICD-10-CM

## 2021-07-07 DIAGNOSIS — R80.9 PROTEINURIA, UNSPECIFIED TYPE: ICD-10-CM

## 2021-07-07 PROCEDURE — 99214 OFFICE O/P EST MOD 30 MIN: CPT | Performed by: INTERNAL MEDICINE

## 2021-07-07 PROCEDURE — G0439 PPPS, SUBSEQ VISIT: HCPCS | Performed by: INTERNAL MEDICINE

## 2021-07-07 NOTE — PROGRESS NOTES
Chief Complaint   Patient presents with   [facility] Annual Wellness Visit     Patient is here for a medicare wellness visit. 1. Have you been to the ER, urgent care clinic since your last visit? Hospitalized since your last visit? No    2. Have you seen or consulted any other health care providers outside of the [address] [place] [address] since your last visit? Include any pap smears or colon screening.  No

## 2021-07-08 LAB
ALBUMIN SERPL-MCNC: 4.3 G/DL (ref 3.5–5)
ALBUMIN/GLOB SERPL: 1.2 {RATIO} (ref 1.1–2.2)
ALP SERPL-CCNC: 60 U/L (ref 45–117)
ALT SERPL-CCNC: 26 U/L (ref 12–78)
ANION GAP SERPL CALC-SCNC: 5 MMOL/L (ref 5–15)
APPEARANCE UR: CLEAR
AST SERPL-CCNC: 24 U/L (ref 15–37)
BACTERIA URNS QL MICRO: NEGATIVE /HPF
BASOPHILS # BLD: 0 K/UL (ref 0–0.1)
BASOPHILS NFR BLD: 1 % (ref 0–1)
BILIRUB SERPL-MCNC: 0.7 MG/DL (ref 0.2–1)
BILIRUB UR QL: NEGATIVE
BUN SERPL-MCNC: 20 MG/DL (ref 6–20)
BUN/CREAT SERPL: 15 (ref 12–20)
CALCIUM SERPL-MCNC: 9.3 MG/DL (ref 8.5–10.1)
CHLORIDE SERPL-SCNC: 106 MMOL/L (ref 97–108)
CHOLEST SERPL-MCNC: 160 MG/DL
CO2 SERPL-SCNC: 28 MMOL/L (ref 21–32)
COLOR UR: ABNORMAL
CREAT SERPL-MCNC: 1.34 MG/DL (ref 0.7–1.3)
CREAT UR-MCNC: 134 MG/DL
CRP SERPL HS-MCNC: 0.4 MG/L
DIFFERENTIAL METHOD BLD: ABNORMAL
EOSINOPHIL # BLD: 0.1 K/UL (ref 0–0.4)
EOSINOPHIL NFR BLD: 2 % (ref 0–7)
EPITH CASTS URNS QL MICRO: ABNORMAL /LPF
ERYTHROCYTE [DISTWIDTH] IN BLOOD BY AUTOMATED COUNT: 14.1 % (ref 11.5–14.5)
GLOBULIN SER CALC-MCNC: 3.7 G/DL (ref 2–4)
GLUCOSE SERPL-MCNC: 78 MG/DL (ref 65–100)
GLUCOSE UR STRIP.AUTO-MCNC: NEGATIVE MG/DL
HCT VFR BLD AUTO: 43.3 % (ref 36.6–50.3)
HDLC SERPL-MCNC: 105 MG/DL
HDLC SERPL: 1.5 {RATIO} (ref 0–5)
HGB BLD-MCNC: 13.1 G/DL (ref 12.1–17)
HGB UR QL STRIP: NEGATIVE
HYALINE CASTS URNS QL MICRO: ABNORMAL /LPF (ref 0–5)
IMM GRANULOCYTES # BLD AUTO: 0 K/UL (ref 0–0.04)
IMM GRANULOCYTES NFR BLD AUTO: 0 % (ref 0–0.5)
KETONES UR QL STRIP.AUTO: NEGATIVE MG/DL
LDLC SERPL CALC-MCNC: 42.6 MG/DL (ref 0–100)
LEUKOCYTE ESTERASE UR QL STRIP.AUTO: NEGATIVE
LYMPHOCYTES # BLD: 1.5 K/UL (ref 0.8–3.5)
LYMPHOCYTES NFR BLD: 51 % (ref 12–49)
MCH RBC QN AUTO: 29.8 PG (ref 26–34)
MCHC RBC AUTO-ENTMCNC: 30.3 G/DL (ref 30–36.5)
MCV RBC AUTO: 98.6 FL (ref 80–99)
MONOCYTES # BLD: 0.3 K/UL (ref 0–1)
MONOCYTES NFR BLD: 12 % (ref 5–13)
NEUTS SEG # BLD: 1 K/UL (ref 1.8–8)
NEUTS SEG NFR BLD: 34 % (ref 32–75)
NITRITE UR QL STRIP.AUTO: NEGATIVE
NRBC # BLD: 0 K/UL (ref 0–0.01)
NRBC BLD-RTO: 0 PER 100 WBC
PH UR STRIP: 6.5 [PH] (ref 5–8)
PLATELET # BLD AUTO: 130 K/UL (ref 150–400)
PMV BLD AUTO: 12.5 FL (ref 8.9–12.9)
POTASSIUM SERPL-SCNC: 4.4 MMOL/L (ref 3.5–5.1)
PROT SERPL-MCNC: 8 G/DL (ref 6.4–8.2)
PROT UR STRIP-MCNC: ABNORMAL MG/DL
PROT UR-MCNC: 19 MG/DL (ref 0–11.9)
PSA SERPL-MCNC: 1.8 NG/ML (ref 0.01–4)
RBC # BLD AUTO: 4.39 M/UL (ref 4.1–5.7)
RBC #/AREA URNS HPF: ABNORMAL /HPF (ref 0–5)
RBC MORPH BLD: ABNORMAL
SODIUM SERPL-SCNC: 139 MMOL/L (ref 136–145)
SP GR UR REFRACTOMETRY: 1.02 (ref 1–1.03)
TRIGL SERPL-MCNC: 62 MG/DL (ref ?–150)
UROBILINOGEN UR QL STRIP.AUTO: 1 EU/DL (ref 0.2–1)
VLDLC SERPL CALC-MCNC: 12.4 MG/DL
WBC # BLD AUTO: 2.9 K/UL (ref 4.1–11.1)
WBC URNS QL MICRO: ABNORMAL /HPF (ref 0–4)

## 2021-07-09 LAB — APO B SERPL-MCNC: 50 MG/DL

## 2021-07-11 NOTE — PATIENT INSTRUCTIONS
Medicare Wellness Visit, Male    The best way to live healthy is to have a lifestyle where you eat a well-balanced diet, exercise regularly, limit alcohol use, and quit all forms of tobacco/nicotine, if applicable. Regular preventive services are another way to keep healthy. Preventive services (vaccines, screening tests, monitoring & exams) can help personalize your care plan, which helps you manage your own care. Screening tests can find health problems at the earliest stages, when they are easiest to treat. [facility] follows the current, evidence-based guidelines published by the [facility] Peter ([facility]) when recommending preventive services for our patients. Because we follow these guidelines, sometimes recommendations change over time as research supports it. (For example, a prostate screening blood test is no longer routinely recommended for men with no symptoms). Of course, you and your doctor may decide to screen more often for some diseases, based on your risk and co-morbidities (chronic disease you are already diagnosed with). Preventive services for you include:  - Medicare offers their members a free annual wellness visit, which is time for you and your primary care provider to discuss and plan for your preventive service needs. Take advantage of this benefit every year!  -All adults over age 72 should receive the recommended pneumonia vaccines. Current USPSTF guidelines recommend a series of two vaccines for the best pneumonia protection.   -All adults should have a flu vaccine yearly and tetanus vaccine every 10 years.  -All adults age 48 and older should receive the shingles vaccines (series of two vaccines).        -All adults age 38-68 who are overweight should have a diabetes screening test once every three years.   -Other screening tests & preventive services for persons with diabetes include: an eye exam to screen for diabetic retinopathy, a kidney function test, a foot exam, and stricter control over your cholesterol.   -Cardiovascular screening for adults with routine risk involves an electrocardiogram (ECG) at intervals determined by the provider.   -Colorectal cancer screening should be done for adults age 54-65 with no increased risk factors for colorectal cancer. There are a number of acceptable methods of screening for this type of cancer. Each test has its own benefits and drawbacks. Discuss with your provider what is most appropriate for you during your annual wellness visit. The different tests include: colonoscopy (considered the best screening method), a fecal occult blood test, a fecal DNA test, and sigmoidoscopy.  -All adults born between [facility] should be screened once for Hepatitis C.  -An Abdominal Aortic Aneurysm (AAA) Screening is recommended for men age 73-68 who has ever smoked in their lifetime.      Here is a list of your current Health Maintenance items (your personalized list of preventive services) with a due date:  Health Maintenance Due   Topic Date Due    COVID-19 Vaccine (1) Never done    Shingles Vaccine (1 of 2) Never done    Pneumococcal Vaccine (1 of 1 - PPSV23) Never done

## 2021-07-11 NOTE — PROGRESS NOTES
[address] [facility] and Primary Care  [place] [address]  [address] [place] [id number]  Phone:  [phone]  Fax: [phone]       Chief Complaint   Patient presents with   [facility] Wellness Visit     Patient is here for a medicare wellness visit. .      SUBJECTIVE:    Yecenia Marie is a [de-identified] y.o. male comes in for his yearly physical.  He states he is basically doing well. He has no complaints. He has not had any further problems with his memory, and for that matter he is helping with care for his wife who is now disabled. He is doing a lot of activities around the home. He has a history of ITP which was treated successfully, and no more followup has been required because his platelet count has remained normal.    He has a history of primary hypertension, dyslipidemia, as well as prostatism. Current Outpatient Medications   Medication Sig Dispense Refill    amLODIPine (NORVASC) 10 mg tablet TAKE 1 TABLET BY MOUTH EVERY DAY 30 Tablet 0    telmisartan (MICARDIS) 80 mg tablet TAKE 1 TAB BY MOUTH DAILY. INDICATIONS: HIGH BLOOD PRESSURE 90 Tab 3    finasteride (PROSCAR) 5 mg tablet TAKE 1 TABLET BY MOUTH EVERY DAY 90 Tab 3    simvastatin (ZOCOR) 40 mg tablet TAKE 1 TABLET BY MOUTH EVERY DAY AT NIGHT 90 Tab 3    FLUoxetine (PROzac) 20 mg capsule TAKE ONE CAPSULE BY MOUTH EVERY DAY 30 Cap 11    sildenafil, antihypertensive, (REVATIO) 20 mg tablet TAKE 3 TABLETS BY MOUTH DAILY AS NEEDED *NOT COVERED* 90 Tab 3     Past Medical History:   Diagnosis Date    Hypertension      No past surgical history on file.   Allergies   Allergen Reactions    Lisinopril Swelling         REVIEW OF SYSTEMS:  General: negative for - chills or fever  ENT: negative for - headaches, nasal congestion or tinnitus  Respiratory: negative for - cough, hemoptysis, shortness of breath or wheezing  Cardiovascular : negative for - chest pain, edema, palpitations or shortness of breath  Gastrointestinal: negative for - abdominal pain, blood in stools, heartburn or nausea/vomiting  Genito-Urinary: no dysuria, trouble voiding, or hematuria  Musculoskeletal: negative for - gait disturbance, joint pain, joint stiffness or joint swelling  Neurological: no TIA or stroke symptoms  Hematologic: no bruises, no bleeding, no swollen glands  Integument: no lumps, mole changes, nail changes or rash  Endocrine: no malaise/lethargy or unexpected weight changes      Social History     Socioeconomic History    Marital status:      Spouse name: Not on file    Number of children: 2    Years of education: Not on file    Highest education level: Not on file   Occupational History    Occupation: Retired teacher   Tobacco Use    Smoking status: Never Smoker    Smokeless tobacco: Never Used   Substance and Sexual Activity    Alcohol use: Yes    Drug use: No    Sexual activity: Yes     Partners: Female     Social Determinants of Health     Financial Resource Strain:     Difficulty of Paying Living Expenses:    Food Insecurity:     Worried About Running Out of Food in the Last Year:     [address] in the Last Year:    Transportation Needs:     Lack of Transportation (Medical):  Lack of Transportation (Non-Medical):    Physical Activity:     Days of Exercise per Week:     Minutes of Exercise per Session:    Stress:     Feeling of Stress :    Social Connections:     Frequency of Communication with Friends and Family:     Frequency of Social Gatherings with Friends and Family:     Attends Islam Services:     Active Member of Clubs or Organizations:     Attends Club or Organization Meetings:     Marital Status:      No family history on file.     OBJECTIVE:    Visit Vitals  /71   Pulse (!) 52   Temp 98.2 °F (36.8 °C)   Resp 16   Ht 5' 11\" (1.803 m)   Wt 134 lb (60.8 kg)   SpO2 99%   BMI 18.69 kg/m²     CONSTITUTIONAL: well , well nourished, appears age appropriate  EYES: perrla, eom intact  ENMT:moist mucous membranes, pharynx clear  NECK: supple. Thyroid normal  RESPIRATORY: Chest: clear to ascultation and percussion   CARDIOVASCULAR: Heart: regular rate and rhythm  GASTROINTESTINAL: Abdomen: soft, bowel sounds active  HEMATOLOGIC: no pathological lymph nodes palpated  MUSCULOSKELETAL: Extremities: no edema, pulse 1+   INTEGUMENT: No unusual rashes or suspicious skin lesions noted. Nails appear normal.  NEUROLOGIC: non-focal exam   MENTAL STATUS: alert and oriented, appropriate affect      ASSESSMENT:  1. Type 2 diabetes mellitus without complication, with long-term current use of insulin ([address]    2. Essential hypertension    3. Chronic ITP (idiopathic thrombocytopenia) (HCC)    4. Prostatism    5. Dyslipidemia    6. Depression, unspecified depression type    7. Proteinuria, unspecified type    8. Screening for depression    9. Wellness examination        PLAN:  1. The patient's diabetes historically has been doing quite well. I will await the results of his hemoglobin A1c. His BMI is less than 20. The likelihood of potential progression is extremely low. 2. Blood pressure is excellent. No adjustments are made. 3. He does have a history of ITP, and I will await the results of his CBC. 4. _____ Zoya Truong clipped] is stable. He remains on his finasteride. 5. He has an increased cardiovascular risk. He remains on a statin. He is in a primary risk prevention mode. 6. His depression is doing quite well. He is tolerating his fluoxetine. We will continue that at 20 mg daily. I explained to him that he needs to continue this indefinitely. 7. He does have a history of mild proteinuria and I will await the results of his quantitative protein spillage with a PC ratio.     .  Orders Placed This Encounter    ANNUAL DEPRESSION SCREEN 8-15 MIN    CREATININE, UR, RANDOM    PROTEIN URINE, RANDOM ([facility] Only)    APOLIPOPROTEIN B    CBC WITH AUTOMATED DIFF    CRP, HIGH SENSITIVITY    LIPID PANEL    METABOLIC PANEL, COMPREHENSIVE    PROSTATE SPECIFIC AG    URINALYSIS W/ RFLX MICROSCOPIC         Follow-up and Dispositions    · Return in about 6 months (around 1/7/2022). Rodney Caon MD  This is the Subsequent Medicare Annual Wellness Exam, performed 12 months or more after the Initial AWV or the last Subsequent AWV    I have reviewed the patient's medical history in detail and updated the computerized patient record. Assessment/Plan   Education and counseling provided:  Are appropriate based on today's review and evaluation    1. Type 2 diabetes mellitus without complication, with long-term current use of insulin ([facility]  2. Essential hypertension  -     METABOLIC PANEL, COMPREHENSIVE; Future  -     URINALYSIS W/ RFLX MICROSCOPIC; Future  3. Chronic ITP (idiopathic thrombocytopenia) (HCC)  -     CBC WITH AUTOMATED DIFF; Future  -     COLLECTION VENOUS BLOOD,VENIPUNCTURE  4. Prostatism  -     PSA, DIAGNOSTIC (PROSTATE SPECIFIC AG); Future  5. Dyslipidemia  -     APOLIPOPROTEIN B; Future  -     CRP, HIGH SENSITIVITY; Future  -     LIPID PANEL; Future  6. Depression, unspecified depression type  7. Proteinuria, unspecified type  -     CREATININE, UR, RANDOM; Future  -     PROTEIN URINE, RANDOM; Future  8. Screening for depression  -     DEPRESSION SCREEN ANNUAL  9. Wellness examination       Depression Risk Factor Screening     3 most recent PHQ Screens 4/10/2018   PHQ Not Done -   Little interest or pleasure in doing things Not at all   Feeling down, depressed, irritable, or hopeless Not at all   Total Score PHQ 2 0       Alcohol Risk Screen    Do you average more than 1 drink per night or more than 7 drinks a week: No    In the past three months have you have had more than 4 drinks containing alcohol on one occasion: No        Functional Ability and Level of Safety    Hearing: Hearing is good. Activities of Daily Living: The home contains: no safety equipment.   Patient does total self care Ambulation: with no difficulty     Fall Risk:  Fall Risk Assessment, last 12 mths 7/7/2021   Able to walk? Yes   Fall in past 12 months? 0   Do you feel unsteady? 0   Are you worried about falling 0      Abuse Screen:  Patient is not abused       Cognitive Screening    Has your family/caregiver stated any concerns about your memory: no     Cognitive Screening: Not applicable    Health Maintenance Due     Health Maintenance Due   Topic Date Due    COVID-19 Vaccine (1) Never done    Shingrix Vaccine Age 50> (1 of 2) Never done    Pneumococcal 65+ years (1 of 1 - PPSV23) Never done       Patient Care Team   Patient Care Team:  Bigg Cancino MD as PCP - General (Internal Medicine)  Bigg Cancino MD as PCP - [facility] Provider    History     Patient Active Problem List   Diagnosis Code    Hypertension I10    Prostatism N40.0    Dyslipidemia E78.5    Dermatitis L30.9    Proteinuria R80.9    Depression F32.9    Chronic ITP (idiopathic thrombocytopenia) ([facility]) D69.3     Past Medical History:   Diagnosis Date    Hypertension       No past surgical history on file. Current Outpatient Medications   Medication Sig Dispense Refill    amLODIPine (NORVASC) 10 mg tablet TAKE 1 TABLET BY MOUTH EVERY DAY 30 Tablet 0    telmisartan (MICARDIS) 80 mg tablet TAKE 1 TAB BY MOUTH DAILY. INDICATIONS: HIGH BLOOD PRESSURE 90 Tab 3    finasteride (PROSCAR) 5 mg tablet TAKE 1 TABLET BY MOUTH EVERY DAY 90 Tab 3    simvastatin (ZOCOR) 40 mg tablet TAKE 1 TABLET BY MOUTH EVERY DAY AT NIGHT 90 Tab 3    FLUoxetine (PROzac) 20 mg capsule TAKE ONE CAPSULE BY MOUTH EVERY DAY 30 Cap 11    sildenafil, antihypertensive, (REVATIO) 20 mg tablet TAKE 3 TABLETS BY MOUTH DAILY AS NEEDED *NOT COVERED* 90 Tab 3     Allergies   Allergen Reactions    Lisinopril Swelling       No family history on file.   Social History     Tobacco Use    Smoking status: Never Smoker    Smokeless tobacco: Never Used   Substance Use Topics    Alcohol use: Yes         Sergey Delaney MD

## 2021-07-27 DIAGNOSIS — I10 ESSENTIAL HYPERTENSION: ICD-10-CM

## 2021-07-27 RX ORDER — AMLODIPINE BESYLATE 10 MG/1
TABLET ORAL
Qty: 30 TABLET | Refills: 0 | Status: SHIPPED | OUTPATIENT
Start: 2021-07-27 | End: 2021-08-26

## 2021-07-30 ENCOUNTER — TELEPHONE (OUTPATIENT)
Dept: INTERNAL MEDICINE CLINIC | Age: 81
End: 2021-07-30

## 2021-08-26 DIAGNOSIS — I10 ESSENTIAL HYPERTENSION: ICD-10-CM

## 2021-08-26 RX ORDER — AMLODIPINE BESYLATE 10 MG/1
TABLET ORAL
Qty: 30 TABLET | Refills: 0 | Status: SHIPPED | OUTPATIENT
Start: 2021-08-26 | End: 2021-09-21

## 2021-09-21 DIAGNOSIS — I10 ESSENTIAL HYPERTENSION: ICD-10-CM

## 2021-09-21 RX ORDER — AMLODIPINE BESYLATE 10 MG/1
TABLET ORAL
Qty: 30 TABLET | Refills: 0 | Status: SHIPPED | OUTPATIENT
Start: 2021-09-21 | End: 2021-10-22

## 2021-10-22 DIAGNOSIS — I10 ESSENTIAL HYPERTENSION: ICD-10-CM

## 2021-10-22 RX ORDER — AMLODIPINE BESYLATE 10 MG/1
TABLET ORAL
Qty: 30 TABLET | Refills: 0 | Status: SHIPPED | OUTPATIENT
Start: 2021-10-22 | End: 2021-11-21

## 2021-10-27 RX ORDER — FLUOXETINE HYDROCHLORIDE 20 MG/1
CAPSULE ORAL
Qty: 30 CAPSULE | Refills: 11 | Status: SHIPPED | OUTPATIENT
Start: 2021-10-27

## 2021-11-21 DIAGNOSIS — I10 ESSENTIAL HYPERTENSION: ICD-10-CM

## 2021-11-21 RX ORDER — AMLODIPINE BESYLATE 10 MG/1
TABLET ORAL
Qty: 30 TABLET | Refills: 0 | Status: SHIPPED | OUTPATIENT
Start: 2021-11-21 | End: 2021-12-20

## 2021-12-20 DIAGNOSIS — I10 ESSENTIAL HYPERTENSION: ICD-10-CM

## 2021-12-20 RX ORDER — AMLODIPINE BESYLATE 10 MG/1
TABLET ORAL
Qty: 30 TABLET | Refills: 0 | Status: SHIPPED | OUTPATIENT
Start: 2021-12-20 | End: 2022-01-20

## 2022-01-20 DIAGNOSIS — I10 ESSENTIAL HYPERTENSION: ICD-10-CM

## 2022-01-20 RX ORDER — AMLODIPINE BESYLATE 10 MG/1
TABLET ORAL
Qty: 30 TABLET | Refills: 0 | Status: SHIPPED | OUTPATIENT
Start: 2022-01-20 | End: 2022-02-22

## 2022-01-27 DIAGNOSIS — N40.0 PROSTATISM: ICD-10-CM

## 2022-01-28 RX ORDER — FINASTERIDE 5 MG/1
5 TABLET, FILM COATED ORAL DAILY
Qty: 90 TABLET | Refills: 3 | Status: SHIPPED | OUTPATIENT
Start: 2022-01-28

## 2022-03-18 PROBLEM — F32.A DEPRESSION: Status: ACTIVE | Noted: 2018-05-15

## 2022-03-18 PROBLEM — D69.3 CHRONIC ITP (IDIOPATHIC THROMBOCYTOPENIA) (HCC): Status: ACTIVE | Noted: 2018-05-15

## 2022-03-20 PROBLEM — R80.9 PROTEINURIA: Status: ACTIVE | Noted: 2017-02-09

## 2022-03-23 DIAGNOSIS — I10 ESSENTIAL HYPERTENSION: ICD-10-CM

## 2022-03-23 RX ORDER — AMLODIPINE BESYLATE 10 MG/1
10 TABLET ORAL DAILY
Qty: 30 TABLET | Refills: 0 | Status: SHIPPED | OUTPATIENT
Start: 2022-03-23 | End: 2022-04-25 | Stop reason: SDUPTHER

## 2022-05-10 ENCOUNTER — OFFICE VISIT (OUTPATIENT)
Dept: INTERNAL MEDICINE CLINIC | Age: 82
End: 2022-05-10
Payer: COMMERCIAL

## 2022-05-10 VITALS
HEART RATE: 60 BPM | TEMPERATURE: 97.9 F | SYSTOLIC BLOOD PRESSURE: 129 MMHG | OXYGEN SATURATION: 97 % | RESPIRATION RATE: 16 BRPM | HEIGHT: 71 IN | WEIGHT: 134.7 LBS | DIASTOLIC BLOOD PRESSURE: 77 MMHG | BODY MASS INDEX: 18.86 KG/M2

## 2022-05-10 DIAGNOSIS — N18.31 STAGE 3A CHRONIC KIDNEY DISEASE (HCC): ICD-10-CM

## 2022-05-10 DIAGNOSIS — F32.A DEPRESSION, UNSPECIFIED DEPRESSION TYPE: ICD-10-CM

## 2022-05-10 DIAGNOSIS — N40.0 PROSTATISM: ICD-10-CM

## 2022-05-10 DIAGNOSIS — I10 PRIMARY HYPERTENSION: Primary | ICD-10-CM

## 2022-05-10 DIAGNOSIS — E78.5 DYSLIPIDEMIA: ICD-10-CM

## 2022-05-10 PROBLEM — N18.30 CHRONIC RENAL DISEASE, STAGE III (HCC): Status: ACTIVE | Noted: 2022-05-10

## 2022-05-10 PROCEDURE — 99214 OFFICE O/P EST MOD 30 MIN: CPT | Performed by: INTERNAL MEDICINE

## 2022-05-10 RX ORDER — SIMVASTATIN 40 MG/1
40 TABLET, FILM COATED ORAL
Qty: 90 TABLET | Refills: 3 | Status: SHIPPED | OUTPATIENT
Start: 2022-05-10

## 2022-05-10 NOTE — PROGRESS NOTES
[address] [facility] and Primary Care  [place] [address]  [address] [place] [address]  Phone:  [phone]  Fax: [phone]       Chief Complaint   Patient presents with    Hypertension     routine follow up    . SUBJECTIVE:    Lashell Calero is a 80 y.o. male comes in for return visit after a long absence. He states that he is basically doing well. He needs refill on his simvastatin. He has been taking all of his other medications as prescribed. His depression is doing quite well. He has had no meaningful weight gain. He has a past history of primary hypertension, dyslipidemia, prostatism and chronic ITP. Current Outpatient Medications   Medication Sig Dispense Refill    simvastatin (ZOCOR) 40 mg tablet Take 1 Tablet by mouth nightly. 90 Tablet 3    amLODIPine (NORVASC) 10 mg tablet Take 1 Tablet by mouth daily. Appointment with Dr. Genene Favre is required for further refills 30 Tablet 11    finasteride (PROSCAR) 5 mg tablet Take 1 Tablet by mouth daily. 90 Tablet 3    FLUoxetine (PROzac) 20 mg capsule TAKE 1 CAPSULE BY MOUTH EVERY DAY 30 Capsule 11    telmisartan (MICARDIS) 80 mg tablet TAKE 1 TAB BY MOUTH DAILY. INDICATIONS: HIGH BLOOD PRESSURE 90 Tab 3    sildenafil, antihypertensive, (REVATIO) 20 mg tablet TAKE 3 TABLETS BY MOUTH DAILY AS NEEDED *NOT COVERED* 90 Tab 3     Past Medical History:   Diagnosis Date    Hypertension      No past surgical history on file.   Allergies   Allergen Reactions    Lisinopril Swelling         REVIEW OF SYSTEMS:  General: negative for - chills or fever  ENT: negative for - headaches, nasal congestion or tinnitus  Respiratory: negative for - cough, hemoptysis, shortness of breath or wheezing  Cardiovascular : negative for - chest pain, edema, palpitations or shortness of breath  Gastrointestinal: negative for - abdominal pain, blood in stools, heartburn or nausea/vomiting  Genito-Urinary: no dysuria, trouble voiding, or hematuria  Musculoskeletal: negative for - gait disturbance, joint pain, joint stiffness or joint swelling  Neurological: no TIA or stroke symptoms  Hematologic: no bruises, no bleeding, no swollen glands  Integument: no lumps, mole changes, nail changes or rash  Endocrine: no malaise/lethargy or unexpected weight changes      Social History     Socioeconomic History    Marital status:     Number of children: 2   Occupational History    Occupation: Retired teacher   Tobacco Use    Smoking status: Never Smoker    Smokeless tobacco: Never Used   Substance and Sexual Activity    Alcohol use: Yes    Drug use: No    Sexual activity: Yes     Partners: Female     No family history on file. OBJECTIVE:    Visit Vitals  /77   Pulse 60   Temp 97.9 °F (36.6 °C) (Oral)   Resp 16   Ht 5' 11\" (1.803 m)   Wt 134 lb 11.2 oz (61.1 kg)   SpO2 97%   BMI 18.79 kg/m²     CONSTITUTIONAL: well , well nourished, appears age appropriate  EYES: perrla, eom intact  ENMT:moist mucous membranes, pharynx clear  NECK: supple. Thyroid normal  RESPIRATORY: Chest: clear to ascultation and percussion   CARDIOVASCULAR: Heart: regular rate and rhythm  GASTROINTESTINAL: Abdomen: soft, bowel sounds active  HEMATOLOGIC: no pathological lymph nodes palpated  MUSCULOSKELETAL: Extremities: no edema, pulse 1+   INTEGUMENT: No unusual rashes or suspicious skin lesions noted. Nails appear normal.  NEUROLOGIC: non-focal exam   MENTAL STATUS: alert and oriented, appropriate affect      ASSESSMENT:  1. Primary hypertension    2. Prostatism    3. Stage 3a chronic kidney disease ([address]    4. Depression, unspecified depression type    5. Dyslipidemia        PLAN:  1. The patient's blood pressure is excellent, no adjustments are made today. 2. He does have a history of prostatism. He remains on his finasteride and I will await the results of his PSA. 3. He has mild chronic renal failure. This is predominantly pre-renal, however.   4. His depression is stable and he remains on his fluoxetine. He will be on this indefinitely. 5. He has an increased cardiovascular risk and remains on his statin. He has not had it in the last several months and I will resume this. His cholesterol will therefore be elevated on labs that will be drawn today. Follow-up and Dispositions    · Return in about 6 months (around 11/10/2022).            Eleanor Coreas MD

## 2022-05-10 NOTE — PROGRESS NOTES
Chief Complaint   Patient presents with    Hypertension     routine follow up          1. Have you been to the ER, urgent care clinic since your last visit? Hospitalized since your last visit? No    2. Have you seen or consulted any other health care providers outside of the [address] [place] [address] since your last visit? Include any pap smears or colon screening.  No

## 2022-05-11 LAB
ALBUMIN SERPL-MCNC: 4 G/DL (ref 3.5–5)
ALBUMIN/GLOB SERPL: 1.1 {RATIO} (ref 1.1–2.2)
ALP SERPL-CCNC: 57 U/L (ref 45–117)
ALT SERPL-CCNC: 16 U/L (ref 12–78)
ANION GAP SERPL CALC-SCNC: 5 MMOL/L (ref 5–15)
APPEARANCE UR: CLEAR
AST SERPL-CCNC: 20 U/L (ref 15–37)
BACTERIA URNS QL MICRO: NEGATIVE /HPF
BASOPHILS # BLD: 0 K/UL (ref 0–0.1)
BASOPHILS NFR BLD: 1 % (ref 0–1)
BILIRUB SERPL-MCNC: 0.4 MG/DL (ref 0.2–1)
BILIRUB UR QL: NEGATIVE
BUN SERPL-MCNC: 17 MG/DL (ref 6–20)
BUN/CREAT SERPL: 13 (ref 12–20)
CALCIUM SERPL-MCNC: 9.3 MG/DL (ref 8.5–10.1)
CHLORIDE SERPL-SCNC: 104 MMOL/L (ref 97–108)
CHOLEST SERPL-MCNC: 178 MG/DL
CO2 SERPL-SCNC: 29 MMOL/L (ref 21–32)
COLOR UR: ABNORMAL
CREAT SERPL-MCNC: 1.32 MG/DL (ref 0.7–1.3)
CRP SERPL HS-MCNC: 0.5 MG/L
DIFFERENTIAL METHOD BLD: ABNORMAL
EOSINOPHIL # BLD: 0.1 K/UL (ref 0–0.4)
EOSINOPHIL NFR BLD: 2 % (ref 0–7)
EPITH CASTS URNS QL MICRO: ABNORMAL /LPF
ERYTHROCYTE [DISTWIDTH] IN BLOOD BY AUTOMATED COUNT: 13.9 % (ref 11.5–14.5)
GLOBULIN SER CALC-MCNC: 3.7 G/DL (ref 2–4)
GLUCOSE SERPL-MCNC: 74 MG/DL (ref 65–100)
GLUCOSE UR STRIP.AUTO-MCNC: NEGATIVE MG/DL
HCT VFR BLD AUTO: 43.3 % (ref 36.6–50.3)
HDLC SERPL-MCNC: 92 MG/DL
HDLC SERPL: 1.9 {RATIO} (ref 0–5)
HGB BLD-MCNC: 13.9 G/DL (ref 12.1–17)
HGB UR QL STRIP: NEGATIVE
HYALINE CASTS URNS QL MICRO: ABNORMAL /LPF (ref 0–5)
IMM GRANULOCYTES # BLD AUTO: 0 K/UL (ref 0–0.04)
IMM GRANULOCYTES NFR BLD AUTO: 0 % (ref 0–0.5)
KETONES UR QL STRIP.AUTO: NEGATIVE MG/DL
LDLC SERPL CALC-MCNC: 72.2 MG/DL (ref 0–100)
LEUKOCYTE ESTERASE UR QL STRIP.AUTO: ABNORMAL
LYMPHOCYTES # BLD: 1.5 K/UL (ref 0.8–3.5)
LYMPHOCYTES NFR BLD: 42 % (ref 12–49)
MCH RBC QN AUTO: 31 PG (ref 26–34)
MCHC RBC AUTO-ENTMCNC: 32.1 G/DL (ref 30–36.5)
MCV RBC AUTO: 96.7 FL (ref 80–99)
MONOCYTES # BLD: 0.4 K/UL (ref 0–1)
MONOCYTES NFR BLD: 11 % (ref 5–13)
NEUTS SEG # BLD: 1.6 K/UL (ref 1.8–8)
NEUTS SEG NFR BLD: 44 % (ref 32–75)
NITRITE UR QL STRIP.AUTO: NEGATIVE
NRBC # BLD: 0 K/UL (ref 0–0.01)
NRBC BLD-RTO: 0 PER 100 WBC
PH UR STRIP: 6.5 [PH] (ref 5–8)
PLATELET # BLD AUTO: 126 K/UL (ref 150–400)
PMV BLD AUTO: 12 FL (ref 8.9–12.9)
POTASSIUM SERPL-SCNC: 4.1 MMOL/L (ref 3.5–5.1)
PROT SERPL-MCNC: 7.7 G/DL (ref 6.4–8.2)
PROT UR STRIP-MCNC: 30 MG/DL
PSA SERPL-MCNC: 1.7 NG/ML (ref 0.01–4)
RBC # BLD AUTO: 4.48 M/UL (ref 4.1–5.7)
RBC #/AREA URNS HPF: ABNORMAL /HPF (ref 0–5)
SODIUM SERPL-SCNC: 138 MMOL/L (ref 136–145)
SP GR UR REFRACTOMETRY: 1.02 (ref 1–1.03)
TRIGL SERPL-MCNC: 69 MG/DL (ref ?–150)
UROBILINOGEN UR QL STRIP.AUTO: 0.2 EU/DL (ref 0.2–1)
VLDLC SERPL CALC-MCNC: 13.8 MG/DL
WBC # BLD AUTO: 3.6 K/UL (ref 4.1–11.1)
WBC URNS QL MICRO: ABNORMAL /HPF (ref 0–4)

## 2022-05-12 LAB — APO B SERPL-MCNC: 72 MG/DL

## 2022-05-14 RX ORDER — TELMISARTAN 80 MG/1
80 TABLET ORAL DAILY
Qty: 90 TABLET | Refills: 3 | Status: SHIPPED | OUTPATIENT
Start: 2022-05-14

## 2022-12-05 RX ORDER — FLUOXETINE HYDROCHLORIDE 20 MG/1
CAPSULE ORAL
Qty: 30 CAPSULE | Refills: 11 | Status: SHIPPED | OUTPATIENT
Start: 2022-12-05

## 2023-02-08 DIAGNOSIS — E78.5 DYSLIPIDEMIA: ICD-10-CM

## 2023-02-08 DIAGNOSIS — N40.0 PROSTATISM: ICD-10-CM

## 2023-02-09 RX ORDER — SIMVASTATIN 40 MG/1
40 TABLET, FILM COATED ORAL
Qty: 90 TABLET | Refills: 3 | Status: SHIPPED | OUTPATIENT
Start: 2023-02-09

## 2023-02-09 RX ORDER — FINASTERIDE 5 MG/1
5 TABLET, FILM COATED ORAL DAILY
Qty: 90 TABLET | Refills: 3 | Status: SHIPPED | OUTPATIENT
Start: 2023-02-09

## 2023-02-15 ENCOUNTER — OFFICE VISIT (OUTPATIENT)
Dept: INTERNAL MEDICINE CLINIC | Age: 83
End: 2023-02-15
Payer: MEDICARE

## 2023-02-15 VITALS
HEART RATE: 54 BPM | HEIGHT: 71 IN | SYSTOLIC BLOOD PRESSURE: 145 MMHG | OXYGEN SATURATION: 100 % | BODY MASS INDEX: 19.02 KG/M2 | DIASTOLIC BLOOD PRESSURE: 76 MMHG | RESPIRATION RATE: 16 BRPM | WEIGHT: 135.9 LBS | TEMPERATURE: 97.9 F

## 2023-02-15 DIAGNOSIS — I10 PRIMARY HYPERTENSION: ICD-10-CM

## 2023-02-15 DIAGNOSIS — E78.5 DYSLIPIDEMIA: ICD-10-CM

## 2023-02-15 DIAGNOSIS — R41.3 POOR SHORT TERM MEMORY: Primary | ICD-10-CM

## 2023-02-15 DIAGNOSIS — D69.3 CHRONIC ITP (IDIOPATHIC THROMBOCYTOPENIA) (HCC): ICD-10-CM

## 2023-02-15 NOTE — PROGRESS NOTES
Miladis Gentile is a 80 y.o. male  Chief Complaint   Patient presents with    Follow-up    Hypertension     Patient here for follow up high blood pressure. 1. Have you been to the ER, urgent care clinic since your last visit? Hospitalized since your last visit? No    2. Have you seen or consulted any other health care providers outside of the [address] [place] [address] since your last visit? Include any pap smears or colon screening.  No

## 2023-02-16 LAB
BASOPHILS # BLD: 0 K/UL (ref 0–0.1)
BASOPHILS NFR BLD: 1 % (ref 0–1)
DIFFERENTIAL METHOD BLD: ABNORMAL
EOSINOPHIL # BLD: 0.1 K/UL (ref 0–0.4)
EOSINOPHIL NFR BLD: 2 % (ref 0–7)
ERYTHROCYTE [DISTWIDTH] IN BLOOD BY AUTOMATED COUNT: 13.4 % (ref 11.5–14.5)
HCT VFR BLD AUTO: 39.5 % (ref 36.6–50.3)
HGB BLD-MCNC: 12.1 G/DL (ref 12.1–17)
IMM GRANULOCYTES # BLD AUTO: 0 K/UL (ref 0–0.04)
IMM GRANULOCYTES NFR BLD AUTO: 0 % (ref 0–0.5)
LYMPHOCYTES # BLD: 1.7 K/UL (ref 0.8–3.5)
LYMPHOCYTES NFR BLD: 49 % (ref 12–49)
MCH RBC QN AUTO: 29.6 PG (ref 26–34)
MCHC RBC AUTO-ENTMCNC: 30.6 G/DL (ref 30–36.5)
MCV RBC AUTO: 96.6 FL (ref 80–99)
MONOCYTES # BLD: 0.5 K/UL (ref 0–1)
MONOCYTES NFR BLD: 14 % (ref 5–13)
NEUTS SEG # BLD: 1.1 K/UL (ref 1.8–8)
NEUTS SEG NFR BLD: 34 % (ref 32–75)
NRBC # BLD: 0 K/UL (ref 0–0.01)
NRBC BLD-RTO: 0 PER 100 WBC
PLATELET # BLD AUTO: 135 K/UL (ref 150–400)
PMV BLD AUTO: 12.3 FL (ref 8.9–12.9)
RBC # BLD AUTO: 4.09 M/UL (ref 4.1–5.7)
TSH SERPL DL<=0.05 MIU/L-ACNC: 2.73 UIU/ML (ref 0.36–3.74)
VIT B12 SERPL-MCNC: 786 PG/ML (ref 193–986)
WBC # BLD AUTO: 3.4 K/UL (ref 4.1–11.1)

## 2023-02-18 PROBLEM — R41.3 POOR SHORT TERM MEMORY: Status: ACTIVE | Noted: 2023-02-18

## 2023-02-18 NOTE — PROGRESS NOTES
[address] [place] [address] and Primary Care  [place] [address]  [address] [place] [address]  Phone:  [phone]  Fax: [phone]       Chief Complaint   Patient presents with    Follow-up    Hypertension     Patient here for follow up high blood pressure. .      SUBJECTIVE:    France Snow is a 80 y.o. male  Dictation on: 02/18/2023  3:03 PM by: Eduardo Verma [[id number]]          Current Outpatient Medications   Medication Sig Dispense Refill    simvastatin (ZOCOR) 40 mg tablet Take 1 Tablet by mouth nightly. 90 Tablet 3    finasteride (PROSCAR) 5 mg tablet Take 1 Tablet by mouth daily. 90 Tablet 3    FLUoxetine (PROzac) 20 mg capsule TAKE 1 CAPSULE BY MOUTH EVERY DAY 30 Capsule 11    telmisartan (MICARDIS) 80 mg tablet TAKE 1 TAB BY MOUTH DAILY. INDICATIONS: HIGH BLOOD PRESSURE 90 Tablet 3    amLODIPine (NORVASC) 10 mg tablet Take 1 Tablet by mouth daily. Appointment with Dr. Julio Webster is required for further refills 30 Tablet 11    sildenafil, antihypertensive, (REVATIO) 20 mg tablet TAKE 3 TABLETS BY MOUTH DAILY AS NEEDED *NOT COVERED* (Patient not taking: Reported on 2/15/2023) 90 Tab 3     Past Medical History:   Diagnosis Date    Hypertension      History reviewed. No pertinent surgical history.   Allergies   Allergen Reactions    Lisinopril Swelling         REVIEW OF SYSTEMS:  General: negative for - chills or fever  ENT: negative for - headaches, nasal congestion or tinnitus  Respiratory: negative for - cough, hemoptysis, shortness of breath or wheezing  Cardiovascular : negative for - chest pain, edema, palpitations or shortness of breath  Gastrointestinal: negative for - abdominal pain, blood in stools, heartburn or nausea/vomiting  Genito-Urinary: no dysuria, trouble voiding, or hematuria  Musculoskeletal: negative for - gait disturbance, joint pain, joint stiffness or joint swelling  Neurological: no TIA or stroke symptoms  Hematologic: no bruises, no bleeding, no swollen glands  Integument: no lumps, mole changes, nail changes or rash  Endocrine: no malaise/lethargy or unexpected weight changes      Social History     Socioeconomic History    Marital status:     Number of children: 2   Occupational History    Occupation: Retired teacher   Tobacco Use    Smoking status: Never    Smokeless tobacco: Never   Vaping Use    Vaping Use: Never used   Substance and Sexual Activity    Alcohol use: Yes    Drug use: No    Sexual activity: Yes     Partners: Female     History reviewed. No pertinent family history. OBJECTIVE:    Visit Vitals  BP (!) 145/76   Pulse (!) 54   Temp 97.9 °F (36.6 °C) (Oral)   Resp 16   Ht 5' 11\" (1.803 m)   Wt 135 lb 14.4 oz (61.6 kg)   SpO2 100%   BMI 18.95 kg/m²     CONSTITUTIONAL: well , well nourished, appears age appropriate  EYES: perrla, eom intact  ENMT:moist mucous membranes, pharynx clear  NECK: supple. Thyroid normal  RESPIRATORY: Chest: clear to ascultation and percussion   CARDIOVASCULAR: Heart: regular rate and rhythm  GASTROINTESTINAL: Abdomen: soft, bowel sounds active  HEMATOLOGIC: no pathological lymph nodes palpated  MUSCULOSKELETAL: Extremities: no edema, pulse 1+   INTEGUMENT: No unusual rashes or suspicious skin lesions noted. Nails appear normal.  NEUROLOGIC: non-focal exam   MENTAL STATUS: alert and oriented, appropriate affect      ASSESSMENT:  1. Poor short term memory    2. Chronic ITP (idiopathic thrombocytopenia) (HCC)    3. Primary hypertension    4. Dyslipidemia        PLAN:   Dictation on: 02/18/2023  3:04 PM by: Christophe Lopez [[id number]]     Orders Placed This Encounter    CBC WITH AUTOMATED DIFF    VITAMIN B12    TSH 3RD GENERATION    REFERRAL TO NEUROLOGY         Follow-up and Dispositions    Return in about 6 months (around 8/15/2023).            Jeanie Rees MD

## 2023-02-19 NOTE — PROGRESS NOTES
comes in for his yearly physical. He states that he is basically doing well. Interestingly, his wife accompanies him and states that he is having major problems with his short term memory. She is quite concerned because when he attempts to drive to a location he often times gets lost.    Interestingly about two to three years ago, he was in a severe state of depression which had a significant impact on his memory. This however improved to the point where he was almost back to his baseline and could drive without restrictions. This was obviously changed. He is taking his antidepressant currently and has no suggestive symptoms of flare. He has a past history of primary hypertension, as well as dyslipidemia. He is not that physically active.

## 2023-02-19 NOTE — PROGRESS NOTES
1.  The patient has problems with his short term memory. This suggests the possibility of early dementia. In any event the patient will be seen by Neurology for evaluation. 2. He does have a history of ITP which has been reasonably stable. I will await the results of his CBC to ensure that this remains the same. 3. His blood pressure is excellent, no adjustments are made. 4. He remains on his statin in view of his increased cardiovascular risk.

## 2023-02-24 ENCOUNTER — OFFICE VISIT (OUTPATIENT)
Dept: NEUROLOGY | Age: 83
End: 2023-02-24
Payer: MEDICARE

## 2023-02-24 VITALS
SYSTOLIC BLOOD PRESSURE: 142 MMHG | DIASTOLIC BLOOD PRESSURE: 61 MMHG | WEIGHT: 136 LBS | BODY MASS INDEX: 18.97 KG/M2 | HEART RATE: 57 BPM | RESPIRATION RATE: 14 BRPM | OXYGEN SATURATION: 99 %

## 2023-02-24 DIAGNOSIS — R41.0 CONFUSION: ICD-10-CM

## 2023-02-24 DIAGNOSIS — I65.23 CAROTID STENOSIS, BILATERAL: ICD-10-CM

## 2023-02-24 DIAGNOSIS — R41.3 MEMORY DIFFICULTIES: Primary | ICD-10-CM

## 2023-02-24 NOTE — PROGRESS NOTES
[address] [facility] and [address] at [facility] Neurology Clinics at [address] [facility], [address] [facility] 7793 [address] [facility], [address] [place] [address]  [phone] Office  [phone]           Referring: Eduardo Maldonado MD  [facility]  [address] [address]  [place],  [place]     Chief Complaint   Patient presents with    New Patient    Memory Loss     \"Significant reduction in STM\"     Mr. Alec Pride is a pleasant 70-year-old gentleman who presents today accompanied by his wife for initial neurologic consultation regarding worsening memory difficulty and confusion. Both provide history. He has noted difficulty with recall worsening over the last several months. He cannot remember the date. He cannot remember conversations. He gets confused about whether he ask a question he will repeat the question. He gets confused about how to go somewhere and his wife has to tell him the directions. He does say that he is just very cautious and has heightened sensitivity and anxiety when driving because 1 time he got lost badly and he does not want to do that again. No family history of dementia. Interestingly he had an episode where his wife says he was confused with depression. He apparently had the same type of presentation with memory difficulty and confusion in 2019 she believes and he was found to be depressed and he returned to baseline with treatment of the depression. He recounts the death of their daughter and he tells me that it still weighs very heavy on him. He denies feeling depressed. Record review finds office visit with Dr. Andreia Wells dated February 15, 2023 where patient was following up for hypertension. Wife noted he was having major problems with short-term memory and she was concerned because he would get lost when driving.   It was noted 2 or 3 years ago he was in a severe state of depression and that had significant impact on his memory. This did improve to the point that he was at his baseline. He was taking his antidepressant. Denied any symptoms of depression. He was referred to neurology. 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 Past Medical History:   Diagnosis Date    Hypertension        History reviewed. No pertinent surgical history. Current Outpatient Medications   Medication Sig Dispense Refill    simvastatin (ZOCOR) 40 mg tablet Take 1 Tablet by mouth nightly. 90 Tablet 3    finasteride (PROSCAR) 5 mg tablet Take 1 Tablet by mouth daily. 90 Tablet 3    FLUoxetine (PROzac) 20 mg capsule TAKE 1 CAPSULE BY MOUTH EVERY DAY 30 Capsule 11    telmisartan (MICARDIS) 80 mg tablet TAKE 1 TAB BY MOUTH DAILY. INDICATIONS: HIGH BLOOD PRESSURE 90 Tablet 3    amLODIPine (NORVASC) 10 mg tablet Take 1 Tablet by mouth daily. Appointment with Dr. Beto Montez is required for further refills 30 Tablet 11    sildenafil, antihypertensive, (REVATIO) 20 mg tablet TAKE 3 TABLETS BY MOUTH DAILY AS NEEDED *NOT COVERED* (Patient not taking: Reported on 2/24/2023) 90 Tab 3        Allergies   Allergen Reactions    Lisinopril Swelling       Social History     Tobacco Use    Smoking status: Never    Smokeless tobacco: Never   Vaping Use    Vaping Use: Never used   Substance Use Topics    Alcohol use: Yes    Drug use: No       History reviewed. No pertinent family history. Review of Systems  Pertinent positives and negatives as noted. Examination  Visit Vitals  BP (!) 142/61 (BP 1 Location: Left upper arm, BP Patient Position: Sitting, BP Cuff Size: Adult)   Pulse (!) 57   Resp 14   Wt 61.7 kg (136 lb)   SpO2 99%   BMI 18.97 kg/m²     He is a pleasant engaging gentleman.   He is awake alert oriented to February 23 but he does not know the year. He knows were on the second floor in [facility]. He cannot recall the president even with a clue. He calculates coins. He spells house forward and backward. Registration 3/3 with recall 2/3 when given clues 3/3. Follows all commands. He is fluent. Cranial nerves are intact 2-12. He has no nystagmus. There is no motor focality with full strength in all extremities to direct testing in all muscle groups. His reflexes are symmetric. There is no ataxia. Impression/Plan  80year-old gentleman with progressive memory difficulty/confusion and differential diagnosis certainly includes age-related cognitive decline versus mild cognitive impairment versus early dementing process versus processing issue versus attention versus emotional versus metabolic, structural versus vascular versus other    His history of pseudodementia certainly raises the question as to whether or not this may be underlying issue    Formal neurocognitive eval  MRI  EEG  Carotid Doppler  Follow-up after testing    Bria Mckeon MD          This note was created using voice recognition software. Despite editing, there may be syntax errors.

## 2023-02-24 NOTE — PATIENT INSTRUCTIONS
[employer] Neuro[facility] Test Result Communication    Test results are available in [facility] is the patient portal into our electronic health record. This feature allows patients to see diagnostic test results, immunizations, allergies, past medical and surgical history, current medications, and send messages directly to providers. Our team members at the  can provide additional information and assist with registration. The [facility] support team can be reached at [phone]. In some cases, a provider might need time to explain the results in detail during a follow-up appointment. This might include additional information or context that will help patients understand the reason for next steps in the plan of care recommended by their provider. If a patient chooses to receive diagnostic testing at an imaging center outside of the [facility], it is the patient's responsibility to bring the imaging report and disc to their [address] [place] [address] follow-up appointment. If the test results reveal anything that is particularly noteworthy, we will contact you to discuss the matter and, if necessary, schedule a follow-up appointment at an earlier date. If you have not received your test results by [facility] or other communication within 7 days, please contact our office. An inquiry can be sent to your provider using [facility]. Alternatively, appointments can be scheduled via telephone to review results. If a follow-up appointment to review results has not been scheduled, [address] office can be reached at [phone]. For appointments at our [facility] or [facility] office, please call [phone].        10 [facility]   Statement to Patients  April 1, 2014      In an effort to ensure the large volume of patient prescription refills is processed in the most efficient and expeditious manner, we are asking our patients to assist us by calling your Pharmacy for all prescription refills, this will include also your  Mail Order Pharmacy. The pharmacy will contact our office electronically to continue the refill process. Please do not wait until the last minute to call your pharmacy. We need at least 48 hours (2days) to fill prescriptions. We also encourage you to call your pharmacy before going to  your prescription to make sure it is ready. With regard to controlled substance prescription refill requests (narcotic refills) that need to be picked up at our office, we ask your cooperation by providing us with at least 72 hours (3days) notice that you will need a refill. We will not refill narcotic prescription refill requests after 4:00pm on any weekday, Monday through Thursday, or after 2:00pm on Fridays, or on the weekends. We encourage everyone to explore another way of getting your prescription refill request processed using [employer], our patient web portal through our electronic medical record system. [employer] is an efficient and effective way to communicate your medication request directly to the office and  downloadable as an rimma on your smart phone . [employer] also features a review functionality that allows you to view your medication list as well as leave messages for your physician. Are you ready to get connected? If so please review the attatched instructions or speak to any of our staff to get you set up right away! Thank you so much for your cooperation. Should you have any questions please contact our Practice Administrator. Neurocognitive consult/testing procedure:    Please contact office after scheduling with one of the facilities listed below with whom you are seeing, date and time of appt and we will fax orders and notes tho that facility and schedule your follow up with our office after testing.       [employer] Neurology at [facility]  Dr. Jamila Delacruz [address]  (C) [phone] [place] [address] Neurology at [facility]  Dr. Matt Kevin  [address] [facility] 330  [address] [address]  (U) [phone]    [facility] [email or web address]  [facility]. [facility] [address] [facility] [address]  (D) [phone]  (H) [phone]    [email or web address]  Dr. Camelia Allen [address]  (V) [phone]  (R) [phone]    [facility]ology ([email or web address]  Dr. Humberto Harris  [address] [place] [address] [address] [facility]  Office: [phone]   Fax: [phone]    [place]   Dr. Violetta Lopez  [address] [place] [address]  ) [phone]  (O) [phone]    [facility].   Dr. Bjorn Ball (provides counseling as well as cognitive evals)  [address] [place] [address].  Er[address] Mil[address]  (P) [phone]  (F) [phone]

## 2023-04-24 DIAGNOSIS — I10 ESSENTIAL HYPERTENSION: ICD-10-CM

## 2023-04-25 RX ORDER — AMLODIPINE BESYLATE 10 MG/1
10 TABLET ORAL DAILY
Qty: 90 TABLET | Refills: 3 | Status: SHIPPED | OUTPATIENT
Start: 2023-04-25

## 2023-06-05 RX ORDER — TELMISARTAN 80 MG/1
TABLET ORAL
Qty: 90 TABLET | Refills: 3 | Status: SHIPPED | OUTPATIENT
Start: 2023-06-05

## 2023-08-21 RX ORDER — FLUOXETINE HYDROCHLORIDE 20 MG/1
CAPSULE ORAL
Qty: 30 CAPSULE | Refills: 11 | Status: SHIPPED | OUTPATIENT
Start: 2023-08-21

## 2023-09-08 ENCOUNTER — TELEPHONE (OUTPATIENT)
Dept: PHARMACY | Facility: CLINIC | Age: 83
End: 2023-09-08

## 2023-09-08 NOTE — TELEPHONE ENCOUNTER
[facility] HE[facility] CLINICAL PHARMACY: ADHERENCE REVIEW  Identified care gap per [employer]: fills at [facility]: ACE/ARB and Statin adherence      ASSESSMENT    ACE/ARB ADHERENCE    Insurance Records claims through  23  (Prior Year [address] [place] [address] = not reported; [address] = 100%; Potential Fail Date: 23):   TELMISARTAN TAB 80 MG last filled on 23 for 90 day supply. Next refill due: 23    Prescribed si tablet/capsule daily    Per Insurer Portal: last filled on 23 for 90 day supply. Per [facility]: last filled on 23 for 90 day supply and READY to . Billed through [employer] 2 refills remaining. BP Readings from Last 3 Encounters:   23 (!) 142/61   02/15/23 (!) 145/76   05/10/22 129/77     CrCl cannot be calculated (Patient's most recent lab result is older than the maximum 180 days allowed. ). Lab Results   Component Value Date    CREATININE 1.32 (H) 05/10/2022     Lab Results   Component Value Date    K 4.1 05/10/2022     STATIN ADHERENCE    Insurance Records claims through  23  (Prior Year [address] [place] [address] = not reported; [address] = 75%; Potential Fail Date: 23):   SIMVASTATIN TAB 40 MG last filled on 23 for 90 day supply. Next refill due: 09.10.23    Prescribed si tablet/capsule daily    Per Insurer Portal: last filled on 23 for 90 day supply. Per [facility]: last filled on 23 for 90 day supply and READY to . Billed through [employer] 2 refills remaining. Lab Results   Component Value Date    CHOL 178 05/10/2022    TRIG 69 05/10/2022    HDL 92 05/10/2022    LDLCALC 72.2 05/10/2022     ALT   Date Value Ref Range Status   05/10/2022 16 12 - 78 U/L Final     AST   Date Value Ref Range Status   05/10/2022 20 15 - 37 U/L Final     The ASCVD Risk score (Gisela DK, et al., 2019) failed to calculate for the following reasons:     The 2019 ASCVD risk score is only valid for ages 36 to 78     PLAN    Per insurer report, LIS-0 - co-pays are based on tiers and

## 2024-03-07 DIAGNOSIS — N40.0 BENIGN PROSTATIC HYPERPLASIA WITHOUT LOWER URINARY TRACT SYMPTOMS: ICD-10-CM

## 2024-03-07 RX ORDER — FINASTERIDE 5 MG/1
5 TABLET, FILM COATED ORAL DAILY
Qty: 90 TABLET | Refills: 3 | Status: SHIPPED | OUTPATIENT
Start: 2024-03-07

## 2024-05-08 DIAGNOSIS — E78.5 HYPERLIPIDEMIA, UNSPECIFIED: ICD-10-CM

## 2024-05-09 RX ORDER — SIMVASTATIN 40 MG
40 TABLET ORAL NIGHTLY
Qty: 30 TABLET | Refills: 0 | Status: SHIPPED | OUTPATIENT
Start: 2024-05-09

## 2024-05-25 DIAGNOSIS — E78.5 HYPERLIPIDEMIA, UNSPECIFIED: ICD-10-CM

## 2024-05-28 RX ORDER — SIMVASTATIN 40 MG
40 TABLET ORAL NIGHTLY
Qty: 30 TABLET | Refills: 0 | Status: SHIPPED | OUTPATIENT
Start: 2024-05-28

## 2024-06-15 DIAGNOSIS — I10 ESSENTIAL (PRIMARY) HYPERTENSION: ICD-10-CM

## 2024-06-18 RX ORDER — AMLODIPINE BESYLATE 10 MG/1
TABLET ORAL
Qty: 90 TABLET | Refills: 0 | Status: SHIPPED | OUTPATIENT
Start: 2024-06-18

## 2024-06-18 RX ORDER — TELMISARTAN 80 MG/1
TABLET ORAL
Qty: 90 TABLET | Refills: 0 | Status: SHIPPED | OUTPATIENT
Start: 2024-06-18

## 2024-06-18 RX ORDER — AMLODIPINE BESYLATE 10 MG/1
10 TABLET ORAL DAILY
Qty: 30 TABLET | Refills: 1 | Status: SHIPPED | OUTPATIENT
Start: 2024-06-18

## 2024-06-18 RX ORDER — TELMISARTAN 80 MG/1
TABLET ORAL
Qty: 90 TABLET | Refills: 1 | Status: SHIPPED | OUTPATIENT
Start: 2024-06-18

## 2024-07-06 DIAGNOSIS — E78.5 HYPERLIPIDEMIA, UNSPECIFIED: ICD-10-CM

## 2024-07-09 RX ORDER — SIMVASTATIN 40 MG
40 TABLET ORAL NIGHTLY
Qty: 30 TABLET | Refills: 0 | Status: SHIPPED | OUTPATIENT
Start: 2024-07-09

## 2024-07-12 RX ORDER — AMLODIPINE BESYLATE 10 MG/1
10 TABLET ORAL DAILY
Qty: 90 TABLET | Refills: 1 | OUTPATIENT
Start: 2024-07-12

## 2024-07-23 DIAGNOSIS — E78.5 HYPERLIPIDEMIA, UNSPECIFIED: ICD-10-CM

## 2024-07-24 RX ORDER — SIMVASTATIN 40 MG
40 TABLET ORAL NIGHTLY
Qty: 1 TABLET | Refills: 0 | Status: SHIPPED | OUTPATIENT
Start: 2024-07-24

## 2024-07-29 ENCOUNTER — OFFICE VISIT (OUTPATIENT)
Facility: CLINIC | Age: 84
End: 2024-07-29
Payer: COMMERCIAL

## 2024-07-29 VITALS
TEMPERATURE: 97.7 F | RESPIRATION RATE: 16 BRPM | OXYGEN SATURATION: 98 % | WEIGHT: 131 LBS | HEART RATE: 61 BPM | SYSTOLIC BLOOD PRESSURE: 116 MMHG | HEIGHT: 71 IN | DIASTOLIC BLOOD PRESSURE: 67 MMHG | BODY MASS INDEX: 18.34 KG/M2

## 2024-07-29 DIAGNOSIS — D69.3 IMMUNE THROMBOCYTOPENIC PURPURA (HCC): ICD-10-CM

## 2024-07-29 DIAGNOSIS — Z12.5 SPECIAL SCREENING FOR MALIGNANT NEOPLASM OF PROSTATE: ICD-10-CM

## 2024-07-29 DIAGNOSIS — F33.0 MAJOR DEPRESSIVE DISORDER, RECURRENT, MILD (HCC): ICD-10-CM

## 2024-07-29 DIAGNOSIS — N18.31 CHRONIC KIDNEY DISEASE, STAGE 3A (HCC): ICD-10-CM

## 2024-07-29 DIAGNOSIS — I10 ESSENTIAL (PRIMARY) HYPERTENSION: Primary | ICD-10-CM

## 2024-07-29 DIAGNOSIS — I10 PRIMARY HYPERTENSION: ICD-10-CM

## 2024-07-29 DIAGNOSIS — Z00.00 MEDICARE ANNUAL WELLNESS VISIT, SUBSEQUENT: ICD-10-CM

## 2024-07-29 DIAGNOSIS — F01.A0 MILD VASCULAR DEMENTIA, UNSPECIFIED WHETHER BEHAVIORAL, PSYCHOTIC, OR MOOD DISTURBANCE OR ANXIETY (HCC): ICD-10-CM

## 2024-07-29 DIAGNOSIS — I87.2 VENOUS INSUFFICIENCY: ICD-10-CM

## 2024-07-29 DIAGNOSIS — E78.5 DYSLIPIDEMIA: ICD-10-CM

## 2024-07-29 PROCEDURE — 1123F ACP DISCUSS/DSCN MKR DOCD: CPT | Performed by: INTERNAL MEDICINE

## 2024-07-29 PROCEDURE — 3078F DIAST BP <80 MM HG: CPT | Performed by: INTERNAL MEDICINE

## 2024-07-29 PROCEDURE — 3074F SYST BP LT 130 MM HG: CPT | Performed by: INTERNAL MEDICINE

## 2024-07-29 PROCEDURE — G0439 PPPS, SUBSEQ VISIT: HCPCS | Performed by: INTERNAL MEDICINE

## 2024-07-29 ASSESSMENT — PATIENT HEALTH QUESTIONNAIRE - PHQ9
SUM OF ALL RESPONSES TO PHQ9 QUESTIONS 1 & 2: 0
7. TROUBLE CONCENTRATING ON THINGS, SUCH AS READING THE NEWSPAPER OR WATCHING TELEVISION: NOT AT ALL
2. FEELING DOWN, DEPRESSED OR HOPELESS: NOT AT ALL
SUM OF ALL RESPONSES TO PHQ QUESTIONS 1-9: 0
3. TROUBLE FALLING OR STAYING ASLEEP: NOT AT ALL
4. FEELING TIRED OR HAVING LITTLE ENERGY: NOT AT ALL
SUM OF ALL RESPONSES TO PHQ QUESTIONS 1-9: 0
SUM OF ALL RESPONSES TO PHQ QUESTIONS 1-9: 0
5. POOR APPETITE OR OVEREATING: NOT AT ALL
6. FEELING BAD ABOUT YOURSELF - OR THAT YOU ARE A FAILURE OR HAVE LET YOURSELF OR YOUR FAMILY DOWN: NOT AT ALL
1. LITTLE INTEREST OR PLEASURE IN DOING THINGS: NOT AT ALL
8. MOVING OR SPEAKING SO SLOWLY THAT OTHER PEOPLE COULD HAVE NOTICED. OR THE OPPOSITE, BEING SO FIGETY OR RESTLESS THAT YOU HAVE BEEN MOVING AROUND A LOT MORE THAN USUAL: NOT AT ALL
10. IF YOU CHECKED OFF ANY PROBLEMS, HOW DIFFICULT HAVE THESE PROBLEMS MADE IT FOR YOU TO DO YOUR WORK, TAKE CARE OF THINGS AT HOME, OR GET ALONG WITH OTHER PEOPLE: NOT DIFFICULT AT ALL
SUM OF ALL RESPONSES TO PHQ QUESTIONS 1-9: 0

## 2024-07-29 ASSESSMENT — LIFESTYLE VARIABLES
HOW MANY STANDARD DRINKS CONTAINING ALCOHOL DO YOU HAVE ON A TYPICAL DAY: 1 OR 2
HOW OFTEN DO YOU HAVE A DRINK CONTAINING ALCOHOL: NEVER

## 2024-07-30 LAB
ALBUMIN SERPL-MCNC: 3.9 G/DL (ref 3.5–5)
ALBUMIN/GLOB SERPL: 1 (ref 1.1–2.2)
ALP SERPL-CCNC: 81 U/L (ref 45–117)
ALT SERPL-CCNC: 14 U/L (ref 12–78)
ANION GAP SERPL CALC-SCNC: 4 MMOL/L (ref 5–15)
AST SERPL-CCNC: 17 U/L (ref 15–37)
BASOPHILS # BLD: 0 K/UL (ref 0–0.1)
BASOPHILS NFR BLD: 1 % (ref 0–1)
BILIRUB SERPL-MCNC: 0.9 MG/DL (ref 0.2–1)
BUN SERPL-MCNC: 18 MG/DL (ref 6–20)
BUN/CREAT SERPL: 12 (ref 12–20)
CALCIUM SERPL-MCNC: 9.2 MG/DL (ref 8.5–10.1)
CHLORIDE SERPL-SCNC: 104 MMOL/L (ref 97–108)
CHOLEST SERPL-MCNC: 145 MG/DL
CO2 SERPL-SCNC: 31 MMOL/L (ref 21–32)
CREAT SERPL-MCNC: 1.49 MG/DL (ref 0.7–1.3)
CRP SERPL HS-MCNC: 1 MG/L
DIFFERENTIAL METHOD BLD: ABNORMAL
EOSINOPHIL # BLD: 0 K/UL (ref 0–0.4)
EOSINOPHIL NFR BLD: 1 % (ref 0–7)
ERYTHROCYTE [DISTWIDTH] IN BLOOD BY AUTOMATED COUNT: 13.6 % (ref 11.5–14.5)
GLOBULIN SER CALC-MCNC: 4 G/DL (ref 2–4)
GLUCOSE SERPL-MCNC: 80 MG/DL (ref 65–100)
HCT VFR BLD AUTO: 37.1 % (ref 36.6–50.3)
HDLC SERPL-MCNC: 90 MG/DL
HDLC SERPL: 1.6 (ref 0–5)
HGB BLD-MCNC: 11.9 G/DL (ref 12.1–17)
IMM GRANULOCYTES # BLD AUTO: 0 K/UL (ref 0–0.04)
IMM GRANULOCYTES NFR BLD AUTO: 0 % (ref 0–0.5)
LDLC SERPL CALC-MCNC: 45.8 MG/DL (ref 0–100)
LYMPHOCYTES # BLD: 1.2 K/UL (ref 0.8–3.5)
LYMPHOCYTES NFR BLD: 41 % (ref 12–49)
MCH RBC QN AUTO: 30.6 PG (ref 26–34)
MCHC RBC AUTO-ENTMCNC: 32.1 G/DL (ref 30–36.5)
MCV RBC AUTO: 95.4 FL (ref 80–99)
MONOCYTES # BLD: 0.4 K/UL (ref 0–1)
MONOCYTES NFR BLD: 12 % (ref 5–13)
NEUTS SEG # BLD: 1.3 K/UL (ref 1.8–8)
NEUTS SEG NFR BLD: 45 % (ref 32–75)
NRBC # BLD: 0 K/UL (ref 0–0.01)
NRBC BLD-RTO: 0 PER 100 WBC
PLATELET # BLD AUTO: 146 K/UL (ref 150–400)
PMV BLD AUTO: 11.9 FL (ref 8.9–12.9)
POTASSIUM SERPL-SCNC: 4.5 MMOL/L (ref 3.5–5.1)
PROT SERPL-MCNC: 7.9 G/DL (ref 6.4–8.2)
PSA SERPL-MCNC: 1.7 NG/ML (ref 0.01–4)
RBC # BLD AUTO: 3.89 M/UL (ref 4.1–5.7)
SODIUM SERPL-SCNC: 139 MMOL/L (ref 136–145)
TRIGL SERPL-MCNC: 46 MG/DL
TSH SERPL DL<=0.05 MIU/L-ACNC: 2.34 UIU/ML (ref 0.36–3.74)
VIT B12 SERPL-MCNC: 1170 PG/ML (ref 193–986)
VLDLC SERPL CALC-MCNC: 9.2 MG/DL
WBC # BLD AUTO: 2.9 K/UL (ref 4.1–11.1)

## 2024-08-01 LAB — APO B SERPL-MCNC: 51 MG/DL

## 2024-08-13 PROBLEM — D69.3 IMMUNE THROMBOCYTOPENIC PURPURA (HCC): Status: ACTIVE | Noted: 2018-05-15

## 2024-08-13 PROBLEM — I87.2 VENOUS INSUFFICIENCY: Status: ACTIVE | Noted: 2024-08-13

## 2024-08-13 PROBLEM — F01.A0 MILD VASCULAR DEMENTIA (HCC): Status: ACTIVE | Noted: 2024-08-13

## 2024-08-13 PROBLEM — N18.31 CHRONIC KIDNEY DISEASE, STAGE 3A (HCC): Status: ACTIVE | Noted: 2022-05-10

## 2024-08-13 RX ORDER — SIMVASTATIN 40 MG
40 TABLET ORAL NIGHTLY
Qty: 90 TABLET | Refills: 3 | Status: SHIPPED | OUTPATIENT
Start: 2024-08-13 | End: 2025-08-12

## 2024-08-13 RX ORDER — AMLODIPINE BESYLATE 10 MG/1
TABLET ORAL
Qty: 90 TABLET | Refills: 3 | Status: SHIPPED | OUTPATIENT
Start: 2024-08-13

## 2024-08-13 RX ORDER — TELMISARTAN 80 MG/1
TABLET ORAL
Qty: 90 TABLET | Refills: 3 | Status: SHIPPED | OUTPATIENT
Start: 2024-08-13 | End: 2025-07-28

## 2024-08-14 NOTE — PROGRESS NOTES
1. The patient's blood pressure appears to be quite reasonable, no adjustment is made.  2. He remains on a statin in view of his primary cardiovascular risk prevention status.  3. He probably has mild dementia, probably not of Alzheimer's variety, but vascular.  In any event, he needs to follow with neurology on a regular basis.  I not only inform the patient of this, but his wife.  An appointment will be made for him to see neurology again in the future.  4. CBC will be checked in view of his history of ITP.  5. He has mild chronic renal disease, possibly secondary to hypertension, but I suspect the bulk of this is probably prerenal.  6. He also has a history of chronic depression and should be taking Prozac on a regular basis, which he tolerated well and is quite effective at improving his depression.  7. Finally, he has a history of chronic venous insufficiency related to orthostatic swelling.    
Chief Complaint   Patient presents with    Medicare AWV     \"Have you been to the ER, urgent care clinic since your last visit?  Hospitalized since your last visit?\"    NO    “Have you seen or consulted any other health care providers outside of [facility] since your last visit?”    NO            Click Here for Release of Records Request  
Patient comes in after a long absence.  Apparently he has progressive dementia based on his problems with significant short term memory deficits.  He saw a neurologist, but never followed up.  His wife did not get any useful information regarding his return visit and as a result the patient informs the family that he is doing quite well and did not need to follow up.      He has a past history of primary hypertension, dyslipidemia, prostatism, ITP, mild chronic renal disease, probably secondary to a prerenal component, and depression.      As far as his depression is concerned, he presented with a similar presentation as far as problems with his short term memory after the tragic death of his daughter.  Once he was placed on Prozac, he improved to the point where he was pretty much back to baseline and did not have much in the way of short term memory problems.    Finally, he does have a history of venous insufficiency.    
CBC with Auto Differential; Future  -     COLLECTION VENOUS BLOOD,VENIPUNCTURE  -     Comprehensive Metabolic Panel; Future  Mild vascular dementia, unspecified whether behavioral, psychotic, or mood disturbance or anxiety (HCC)  -     Vitamin B12; Future  -     TSH; Future  Special screening for malignant neoplasm of prostate  -     PSA Screening; Future  Immune thrombocytopenic purpura (HCC)  Chronic kidney disease, stage 3a (HCC)  Major depressive disorder, recurrent, mild  Venous insufficiency  Medicare annual wellness visit, subsequent    Recommendations for Preventive Services Due: see orders and patient instructions/AVS.  Recommended screening schedule for the next 5-10 years is provided to the patient in written form: see Patient Instructions/AVS.     Return in 1 month (on 8/29/2024).     Subjective       Patient's complete Health Risk Assessment and screening values have been reviewed and are found in Flowsheets. The following problems were reviewed today and where indicated follow up appointments were made and/or referrals ordered.    Positive Risk Factor Screenings with Interventions:    Fall Risk:  Do you feel unsteady or are you worried about falling? : (!) yes  2 or more falls in past year?: no  Fall with injury in past year?: no     Interventions:    Reviewed medications, home hazards, visual acuity, and co-morbidities that can increase risk for falls               Abnormal BMI (underweight):  Body mass index is 18.27 kg/m². (!) Abnormal  Interventions:  Patient advised to increase caloric intake      Vision Screen:  Interventions:    Pending      Advanced Directives:  Do you have a Living Will?: (!) No    Intervention:  has NO advanced directive - not interested in additional information                     Objective   Vitals:    07/29/24 1319   BP: 116/67   Site: Left Upper Arm   Position: Sitting   Cuff Size: Small Adult   Pulse: 61   Resp: 16   Temp: 97.7 °F (36.5 °C)   TempSrc: Oral   SpO2: 98%

## 2024-08-14 NOTE — PATIENT INSTRUCTIONS
device in the bathroom with you.   Where can you learn more?  Go to [email or web address].net/patientEd and enter G117 to learn more about \"Preventing Falls: Care Instructions.\"  Current as of: July 17, 2023  Content Version: 14.1  © 3129-1413 [employer].   Care instructions adapted under license by [employer]. If you have questions about a medical condition or this instruction, always ask your healthcare professional. [employer] disclaims any warranty or liability for your use of this information.           Learning About Vision Tests  What are vision tests?     The four most common vision tests are visual acuity tests, refraction, visual field tests, and color vision tests.  Visual acuity (sharpness) tests  These tests are used:  To see if you need glasses or contact lenses.  To monitor an eye problem.  To check an eye injury.  Visual acuity tests are done as part of routine exams. You may also have this test when you get your 's license or apply for some types of jobs.  Visual field tests  These tests are used:  To check for vision loss in any area of your range of vision.  To screen for certain eye diseases.  To look for nerve damage after a stroke, head injury, or other problem that could reduce blood flow to the brain.  Refraction and color tests  A refraction test is done to find the right prescription for glasses and contact lenses.  A color vision test is done to check for color blindness.  Color vision is often tested as part of a routine exam. You may also have this test when you apply for a job where recognizing different colors is important, such as , electronics, or the .  How are vision tests done?  Visual acuity test   You cover one eye at a time.  You read aloud from a wall chart across the room.  You read aloud from a small card that you hold in your hand.  Refraction   You look into a special device.  The device puts lenses of different

## 2024-08-27 DIAGNOSIS — E78.5 DYSLIPIDEMIA: ICD-10-CM

## 2024-08-27 RX ORDER — SIMVASTATIN 40 MG
40 TABLET ORAL NIGHTLY
Qty: 30 TABLET | Refills: 11 | Status: SHIPPED | OUTPATIENT
Start: 2024-08-27

## 2024-09-10 ENCOUNTER — OFFICE VISIT (OUTPATIENT)
Facility: CLINIC | Age: 84
End: 2024-09-10
Payer: COMMERCIAL

## 2024-09-10 VITALS
SYSTOLIC BLOOD PRESSURE: 128 MMHG | RESPIRATION RATE: 16 BRPM | OXYGEN SATURATION: 100 % | TEMPERATURE: 97.5 F | HEART RATE: 58 BPM | BODY MASS INDEX: 18.7 KG/M2 | WEIGHT: 133.6 LBS | DIASTOLIC BLOOD PRESSURE: 72 MMHG | HEIGHT: 71 IN

## 2024-09-10 DIAGNOSIS — F33.0 MAJOR DEPRESSIVE DISORDER, RECURRENT, MILD (HCC): ICD-10-CM

## 2024-09-10 DIAGNOSIS — I10 ESSENTIAL (PRIMARY) HYPERTENSION: ICD-10-CM

## 2024-09-10 DIAGNOSIS — E78.5 DYSLIPIDEMIA: ICD-10-CM

## 2024-09-10 DIAGNOSIS — R41.3 POOR SHORT TERM MEMORY: Primary | ICD-10-CM

## 2024-09-10 DIAGNOSIS — F01.A0 MILD VASCULAR DEMENTIA WITHOUT BEHAVIORAL DISTURBANCE, PSYCHOTIC DISTURBANCE, MOOD DISTURBANCE, OR ANXIETY (HCC): ICD-10-CM

## 2024-09-10 PROCEDURE — 1123F ACP DISCUSS/DSCN MKR DOCD: CPT | Performed by: INTERNAL MEDICINE

## 2024-09-10 PROCEDURE — 99214 OFFICE O/P EST MOD 30 MIN: CPT | Performed by: INTERNAL MEDICINE

## 2024-09-10 PROCEDURE — 1036F TOBACCO NON-USER: CPT | Performed by: INTERNAL MEDICINE

## 2024-09-10 PROCEDURE — G8420 CALC BMI NORM PARAMETERS: HCPCS | Performed by: INTERNAL MEDICINE

## 2024-09-10 PROCEDURE — G8427 DOCREV CUR MEDS BY ELIG CLIN: HCPCS | Performed by: INTERNAL MEDICINE

## 2024-09-10 PROCEDURE — 3074F SYST BP LT 130 MM HG: CPT | Performed by: INTERNAL MEDICINE

## 2024-09-10 PROCEDURE — 3078F DIAST BP <80 MM HG: CPT | Performed by: INTERNAL MEDICINE

## 2024-09-10 SDOH — ECONOMIC STABILITY: FOOD INSECURITY: WITHIN THE PAST 12 MONTHS, THE FOOD YOU BOUGHT JUST DIDN'T LAST AND YOU DIDN'T HAVE MONEY TO GET MORE.: NEVER TRUE

## 2024-09-10 SDOH — ECONOMIC STABILITY: FOOD INSECURITY: WITHIN THE PAST 12 MONTHS, YOU WORRIED THAT YOUR FOOD WOULD RUN OUT BEFORE YOU GOT MONEY TO BUY MORE.: NEVER TRUE

## 2024-09-10 SDOH — ECONOMIC STABILITY: INCOME INSECURITY: HOW HARD IS IT FOR YOU TO PAY FOR THE VERY BASICS LIKE FOOD, HOUSING, MEDICAL CARE, AND HEATING?: NOT HARD AT ALL

## 2024-09-10 ASSESSMENT — ANXIETY QUESTIONNAIRES
IF YOU CHECKED OFF ANY PROBLEMS ON THIS QUESTIONNAIRE, HOW DIFFICULT HAVE THESE PROBLEMS MADE IT FOR YOU TO DO YOUR WORK, TAKE CARE OF THINGS AT HOME, OR GET ALONG WITH OTHER PEOPLE: NOT DIFFICULT AT ALL
1. FEELING NERVOUS, ANXIOUS, OR ON EDGE: NOT AT ALL
7. FEELING AFRAID AS IF SOMETHING AWFUL MIGHT HAPPEN: NOT AT ALL
5. BEING SO RESTLESS THAT IT IS HARD TO SIT STILL: NOT AT ALL
3. WORRYING TOO MUCH ABOUT DIFFERENT THINGS: NOT AT ALL
4. TROUBLE RELAXING: NOT AT ALL
GAD7 TOTAL SCORE: 0
6. BECOMING EASILY ANNOYED OR IRRITABLE: NOT AT ALL
2. NOT BEING ABLE TO STOP OR CONTROL WORRYING: NOT AT ALL

## 2024-09-10 ASSESSMENT — PATIENT HEALTH QUESTIONNAIRE - PHQ9
1. LITTLE INTEREST OR PLEASURE IN DOING THINGS: NOT AT ALL
8. MOVING OR SPEAKING SO SLOWLY THAT OTHER PEOPLE COULD HAVE NOTICED. OR THE OPPOSITE, BEING SO FIGETY OR RESTLESS THAT YOU HAVE BEEN MOVING AROUND A LOT MORE THAN USUAL: NOT AT ALL
SUM OF ALL RESPONSES TO PHQ QUESTIONS 1-9: 0
3. TROUBLE FALLING OR STAYING ASLEEP: NOT AT ALL
9. THOUGHTS THAT YOU WOULD BE BETTER OFF DEAD, OR OF HURTING YOURSELF: NOT AT ALL
SUM OF ALL RESPONSES TO PHQ QUESTIONS 1-9: 0
6. FEELING BAD ABOUT YOURSELF - OR THAT YOU ARE A FAILURE OR HAVE LET YOURSELF OR YOUR FAMILY DOWN: NOT AT ALL
5. POOR APPETITE OR OVEREATING: NOT AT ALL
SUM OF ALL RESPONSES TO PHQ QUESTIONS 1-9: 0
4. FEELING TIRED OR HAVING LITTLE ENERGY: NOT AT ALL
2. FEELING DOWN, DEPRESSED OR HOPELESS: NOT AT ALL
10. IF YOU CHECKED OFF ANY PROBLEMS, HOW DIFFICULT HAVE THESE PROBLEMS MADE IT FOR YOU TO DO YOUR WORK, TAKE CARE OF THINGS AT HOME, OR GET ALONG WITH OTHER PEOPLE: NOT DIFFICULT AT ALL
SUM OF ALL RESPONSES TO PHQ9 QUESTIONS 1 & 2: 0
SUM OF ALL RESPONSES TO PHQ QUESTIONS 1-9: 0
7. TROUBLE CONCENTRATING ON THINGS, SUCH AS READING THE NEWSPAPER OR WATCHING TELEVISION: NOT AT ALL

## 2024-10-29 ENCOUNTER — TELEPHONE (OUTPATIENT)
Dept: PHARMACY | Facility: CLINIC | Age: 84
End: 2024-10-29

## 2024-10-29 NOTE — TELEPHONE ENCOUNTER
[facility] CLINICAL PHARMACY: ADHERENCE REVIEW  Identified care gap per [employer] fills with [facility]: ACE/ARB adherence    Medicare Advantage - MRMA  ACO  Per insurer report, LIS-0 - co-pays are based on tiers and patient is subject to coverage gap.  Patient also appears to be prescribed: Statin    ASSESSMENT    ACE/ARB ADHERENCE    Insurance Records claims through  10.20.24  (Prior Year PDC = 99% - PASSED ; YTD PDC = 76%; Potential Fail Date: 24):   TELMISARTAN TAB 80 MG last filled on 24 for 90 day supply. Next refill due: 24    Prescribed si tablet/capsule daily    Per Reconcile Dispense History and Insurer Portal: last filled on 24 for 90 day supply.     Per [facility]: Billed through [employer]. 1 refills remaining. will get  90 day supply ready to  since past due. Left Message     BP Readings from Last 3 Encounters:   09/10/24 128/72   24 116/67   23 (!) 142/61     Estimated Creatinine Clearance: 32 mL/min (A) (based on SCr of 1.49 mg/dL (H)).  Lab Results   Component Value Date    CREATININE 1.49 (H) 2024     Lab Results   Component Value Date    K 4.5 2024     STATIN ADHERENCE    Insurance Records claims through  10.20.24  (Prior Year PDC = 60% - FAILED; YTD PDC = 88%; Potential Fail Date: 24):   SIMVASTATIN TAB 40 MG last filled on 24 for 90 day supply. Next refill due: 24    Prescribed si tablet/capsule daily    Per Reconcile Dispense History and Insurer Portal: last filled on 24 for 90 day supply.     Per [facility]: last picked up on 24 for 90 day supply. Billed through [employer]. 3 refills remaining.    Lab Results   Component Value Date    CHOL 145 2024    TRIG 46 2024    HDL 90 2024     Lab Results   Component Value Date    LDL 45.8 2024      ALT   Date Value Ref Range Status   2024 14 12 - 78 U/L Final     AST   Date Value Ref Range Status   2024 17 15 - 37 U/L Final

## 2024-12-17 ENCOUNTER — TELEPHONE (OUTPATIENT)
Dept: PHARMACY | Facility: CLINIC | Age: 84
End: 2024-12-17

## 2024-12-17 NOTE — TELEPHONE ENCOUNTER
[facility] CLINICAL PHARMACY: ADHERENCE REVIEW  Identified care gap per [employer] [facility]: Statin adherence    Last Visit:  9/10/24  Next Visit:  3/11/25    Patient also appears to be prescribed:ACE/ARB  Medicare Advantage - MRMA  ACO      ASSESSMENT  ACE/ARB ADHERENCE    Insurance Records claims through 12/17/24  (Prior Year PDC = 99% - PASSED ; YTD PDC = 63% - FAILED):     TELMISARTAN 80 MG last filled on 6/18/2024 for 90 / 90 day supply. Next refill due: 9/16/2024    Prescriber: FLORIAN AREVALO   [x] [facility] [] Outside Provider       [] Crital fill [] CURRENT refill [] FUTURE refill [] RX CHANGE    BP Readings from Last 3 Encounters:   09/10/24 128/72   07/29/24 116/67   02/24/23 (!) 142/61      Lab Results   Component Value Date/Time    CREATININE 1.49 07/29/2024 02:08 PM    LABGLOM 46 07/29/2024 02:08 PM    K 4.5 07/29/2024 02:08 PM       STATIN ADHERENCE    Insurance Records claims through 12/17/24 (Prior Year PDC = 60% - FAILED; YTD PDC = 81%; Potential Fail Date: 12/21/2024):     SIMVASTATIN 40 MG last filled on 8/27/2024 for 90 / 90 day supply. Next refill due: 11/25/2024    Prescriber: FLORIAN AREVALO   [] [facility] [] Outside Provider     Per Reconcile Dispense (Epic):  3 refills remaining.  Last picked up 8/28    [] Crital fill [] CURRENT refill [] FUTURE refill [] RX CHANGE    Lab Results   Component Value Date/Time    CHOL 145 07/29/2024 02:08 PM    TRIG 46 07/29/2024 02:08 PM    HDL 90 07/29/2024 02:08 PM    LDL 45.8 07/29/2024 02:08 PM    LDL 72.2 05/10/2022 04:35 PM    VLDL 9.2 07/29/2024 02:08 PM    ALT 14 07/29/2024 02:08 PM    AST 17 07/29/2024 02:08 PM     The ASCVD Risk score (Gisela DK, et al., 2019) failed to calculate for the following reasons:    The 2019 ASCVD risk score is only valid for ages 40 to 79     PLAN  The following are interventions that have been identified:   Patient OVERDUE refilling SIMVASTATIN 40 MG and active on home medication list.   Outreach:  Pharmacy: [facility] for

## 2025-05-30 ENCOUNTER — OFFICE VISIT (OUTPATIENT)
Facility: CLINIC | Age: 85
End: 2025-05-30
Payer: MEDICARE

## 2025-05-30 VITALS
HEART RATE: 63 BPM | TEMPERATURE: 97.7 F | HEIGHT: 71 IN | WEIGHT: 137.4 LBS | BODY MASS INDEX: 19.23 KG/M2 | DIASTOLIC BLOOD PRESSURE: 75 MMHG | OXYGEN SATURATION: 97 % | SYSTOLIC BLOOD PRESSURE: 150 MMHG | RESPIRATION RATE: 16 BRPM

## 2025-05-30 DIAGNOSIS — N18.31 CHRONIC KIDNEY DISEASE, STAGE 3A (HCC): ICD-10-CM

## 2025-05-30 DIAGNOSIS — F33.0 MAJOR DEPRESSIVE DISORDER, RECURRENT, MILD: ICD-10-CM

## 2025-05-30 DIAGNOSIS — N40.0 PROSTATISM: ICD-10-CM

## 2025-05-30 DIAGNOSIS — Z12.5 SPECIAL SCREENING FOR MALIGNANT NEOPLASM OF PROSTATE: ICD-10-CM

## 2025-05-30 DIAGNOSIS — E78.5 DYSLIPIDEMIA: Primary | ICD-10-CM

## 2025-05-30 DIAGNOSIS — F01.A0 MILD VASCULAR DEMENTIA WITHOUT BEHAVIORAL DISTURBANCE, PSYCHOTIC DISTURBANCE, MOOD DISTURBANCE, OR ANXIETY (HCC): ICD-10-CM

## 2025-05-30 DIAGNOSIS — I10 ESSENTIAL (PRIMARY) HYPERTENSION: ICD-10-CM

## 2025-05-30 DIAGNOSIS — Z00.00 MEDICARE ANNUAL WELLNESS VISIT, SUBSEQUENT: ICD-10-CM

## 2025-05-30 DIAGNOSIS — D69.3 IMMUNE THROMBOCYTOPENIC PURPURA (HCC): ICD-10-CM

## 2025-05-30 PROCEDURE — 1123F ACP DISCUSS/DSCN MKR DOCD: CPT | Performed by: INTERNAL MEDICINE

## 2025-05-30 PROCEDURE — G0439 PPPS, SUBSEQ VISIT: HCPCS | Performed by: INTERNAL MEDICINE

## 2025-05-30 PROCEDURE — 1126F AMNT PAIN NOTED NONE PRSNT: CPT | Performed by: INTERNAL MEDICINE

## 2025-05-30 PROCEDURE — 99212 OFFICE O/P EST SF 10 MIN: CPT | Performed by: INTERNAL MEDICINE

## 2025-05-30 PROCEDURE — 3077F SYST BP >= 140 MM HG: CPT | Performed by: INTERNAL MEDICINE

## 2025-05-30 PROCEDURE — 1159F MED LIST DOCD IN RCRD: CPT | Performed by: INTERNAL MEDICINE

## 2025-05-30 PROCEDURE — 3078F DIAST BP <80 MM HG: CPT | Performed by: INTERNAL MEDICINE

## 2025-05-30 SDOH — ECONOMIC STABILITY: FOOD INSECURITY: WITHIN THE PAST 12 MONTHS, THE FOOD YOU BOUGHT JUST DIDN'T LAST AND YOU DIDN'T HAVE MONEY TO GET MORE.: NEVER TRUE

## 2025-05-30 SDOH — ECONOMIC STABILITY: FOOD INSECURITY: WITHIN THE PAST 12 MONTHS, YOU WORRIED THAT YOUR FOOD WOULD RUN OUT BEFORE YOU GOT MONEY TO BUY MORE.: NEVER TRUE

## 2025-05-30 ASSESSMENT — LIFESTYLE VARIABLES
HOW MANY STANDARD DRINKS CONTAINING ALCOHOL DO YOU HAVE ON A TYPICAL DAY: PATIENT DOES NOT DRINK
HOW OFTEN DO YOU HAVE A DRINK CONTAINING ALCOHOL: NEVER

## 2025-05-30 ASSESSMENT — PATIENT HEALTH QUESTIONNAIRE - PHQ9
3. TROUBLE FALLING OR STAYING ASLEEP: NOT AT ALL
SUM OF ALL RESPONSES TO PHQ QUESTIONS 1-9: 0
10. IF YOU CHECKED OFF ANY PROBLEMS, HOW DIFFICULT HAVE THESE PROBLEMS MADE IT FOR YOU TO DO YOUR WORK, TAKE CARE OF THINGS AT HOME, OR GET ALONG WITH OTHER PEOPLE: NOT DIFFICULT AT ALL
8. MOVING OR SPEAKING SO SLOWLY THAT OTHER PEOPLE COULD HAVE NOTICED. OR THE OPPOSITE, BEING SO FIGETY OR RESTLESS THAT YOU HAVE BEEN MOVING AROUND A LOT MORE THAN USUAL: NOT AT ALL
2. FEELING DOWN, DEPRESSED OR HOPELESS: NOT AT ALL
9. THOUGHTS THAT YOU WOULD BE BETTER OFF DEAD, OR OF HURTING YOURSELF: NOT AT ALL
SUM OF ALL RESPONSES TO PHQ QUESTIONS 1-9: 0
4. FEELING TIRED OR HAVING LITTLE ENERGY: NOT AT ALL
SUM OF ALL RESPONSES TO PHQ QUESTIONS 1-9: 0
1. LITTLE INTEREST OR PLEASURE IN DOING THINGS: NOT AT ALL
6. FEELING BAD ABOUT YOURSELF - OR THAT YOU ARE A FAILURE OR HAVE LET YOURSELF OR YOUR FAMILY DOWN: NOT AT ALL
5. POOR APPETITE OR OVEREATING: NOT AT ALL
7. TROUBLE CONCENTRATING ON THINGS, SUCH AS READING THE NEWSPAPER OR WATCHING TELEVISION: NOT AT ALL
SUM OF ALL RESPONSES TO PHQ QUESTIONS 1-9: 0

## 2025-05-30 NOTE — PROGRESS NOTES
Chief Complaint   Patient presents with    Medicare AWV     Patient states he is present for a annual exam.    Have you been to the ER, urgent care clinic since your last visit?  Hospitalized since your last visit?   NO    Have you seen or consulted any other health care providers outside our system since your last visit?   NO

## 2025-05-31 LAB
ALBUMIN SERPL-MCNC: 3.8 G/DL (ref 3.5–5)
ALBUMIN/GLOB SERPL: 0.9 (ref 1.1–2.2)
ALP SERPL-CCNC: 69 U/L (ref 45–117)
ALT SERPL-CCNC: 12 U/L (ref 12–78)
ANION GAP SERPL CALC-SCNC: 5 MMOL/L (ref 2–12)
APPEARANCE UR: CLEAR
AST SERPL-CCNC: 11 U/L (ref 15–37)
BACTERIA URNS QL MICRO: NEGATIVE /HPF
BASOPHILS # BLD: 0.02 K/UL (ref 0–0.1)
BASOPHILS NFR BLD: 0.8 % (ref 0–1)
BILIRUB SERPL-MCNC: 0.6 MG/DL (ref 0.2–1)
BILIRUB UR QL: NEGATIVE
BUN SERPL-MCNC: 15 MG/DL (ref 6–20)
BUN/CREAT SERPL: 10 (ref 12–20)
CALCIUM SERPL-MCNC: 9.3 MG/DL (ref 8.5–10.1)
CHLORIDE SERPL-SCNC: 104 MMOL/L (ref 97–108)
CHOLEST SERPL-MCNC: 183 MG/DL
CO2 SERPL-SCNC: 30 MMOL/L (ref 21–32)
COLOR UR: ABNORMAL
CREAT SERPL-MCNC: 1.43 MG/DL (ref 0.7–1.3)
CRP SERPL HS-MCNC: 1.3 MG/L
DIFFERENTIAL METHOD BLD: ABNORMAL
EOSINOPHIL # BLD: 0.05 K/UL (ref 0–0.4)
EOSINOPHIL NFR BLD: 2 % (ref 0–7)
EPITH CASTS URNS QL MICRO: ABNORMAL /LPF
ERYTHROCYTE [DISTWIDTH] IN BLOOD BY AUTOMATED COUNT: 14.1 % (ref 11.5–14.5)
GLOBULIN SER CALC-MCNC: 4.1 G/DL (ref 2–4)
GLUCOSE SERPL-MCNC: 85 MG/DL (ref 65–100)
GLUCOSE UR STRIP.AUTO-MCNC: NEGATIVE MG/DL
HCT VFR BLD AUTO: 43.3 % (ref 36.6–50.3)
HDLC SERPL-MCNC: 89 MG/DL
HDLC SERPL: 2.1 (ref 0–5)
HGB BLD-MCNC: 13.1 G/DL (ref 12.1–17)
HGB UR QL STRIP: NEGATIVE
HYALINE CASTS URNS QL MICRO: ABNORMAL /LPF (ref 0–5)
IMM GRANULOCYTES # BLD AUTO: 0 K/UL (ref 0–0.04)
IMM GRANULOCYTES NFR BLD AUTO: 0 % (ref 0–0.5)
KETONES UR QL STRIP.AUTO: NEGATIVE MG/DL
LDLC SERPL CALC-MCNC: 81 MG/DL (ref 0–100)
LEUKOCYTE ESTERASE UR QL STRIP.AUTO: NEGATIVE
LYMPHOCYTES # BLD: 0.97 K/UL (ref 0.8–3.5)
LYMPHOCYTES NFR BLD: 38.5 % (ref 12–49)
MCH RBC QN AUTO: 29.6 PG (ref 26–34)
MCHC RBC AUTO-ENTMCNC: 30.3 G/DL (ref 30–36.5)
MCV RBC AUTO: 97.7 FL (ref 80–99)
MONOCYTES # BLD: 0.32 K/UL (ref 0–1)
MONOCYTES NFR BLD: 12.7 % (ref 5–13)
NEUTS SEG # BLD: 1.16 K/UL (ref 1.8–8)
NEUTS SEG NFR BLD: 46 % (ref 32–75)
NITRITE UR QL STRIP.AUTO: NEGATIVE
NRBC # BLD: 0 K/UL (ref 0–0.01)
NRBC BLD-RTO: 0 PER 100 WBC
PH UR STRIP: 6.5 (ref 5–8)
PLATELET # BLD AUTO: 135 K/UL (ref 150–400)
PMV BLD AUTO: 12.5 FL (ref 8.9–12.9)
POTASSIUM SERPL-SCNC: 4.2 MMOL/L (ref 3.5–5.1)
PROT SERPL-MCNC: 7.9 G/DL (ref 6.4–8.2)
PROT UR STRIP-MCNC: 100 MG/DL
PSA SERPL-MCNC: 2.9 NG/ML (ref 0.01–4)
RBC # BLD AUTO: 4.43 M/UL (ref 4.1–5.7)
RBC #/AREA URNS HPF: ABNORMAL /HPF (ref 0–5)
SODIUM SERPL-SCNC: 139 MMOL/L (ref 136–145)
SP GR UR REFRACTOMETRY: 1.01 (ref 1–1.03)
TRIGL SERPL-MCNC: 65 MG/DL
UROBILINOGEN UR QL STRIP.AUTO: 1 EU/DL (ref 0.2–1)
VLDLC SERPL CALC-MCNC: 13 MG/DL
WBC # BLD AUTO: 2.5 K/UL (ref 4.1–11.1)
WBC URNS QL MICRO: ABNORMAL /HPF (ref 0–4)
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
## 2025-06-01 LAB — APO B SERPL-MCNC: 81 MG/DL

## 2025-06-13 ENCOUNTER — TELEPHONE (OUTPATIENT)
Age: 85
End: 2025-06-13

## 2025-06-13 NOTE — TELEPHONE ENCOUNTER
Please contact wife Ritu to schedule appt for Billy as soon as possible. Patient is getting progressively worse.    Referral in chart.    [phone]  Thanks!

## 2025-07-21 ENCOUNTER — RESULTS FOLLOW-UP (OUTPATIENT)
Facility: CLINIC | Age: 85
End: 2025-07-21